# Patient Record
Sex: MALE | Race: WHITE | NOT HISPANIC OR LATINO | Employment: OTHER | URBAN - METROPOLITAN AREA
[De-identification: names, ages, dates, MRNs, and addresses within clinical notes are randomized per-mention and may not be internally consistent; named-entity substitution may affect disease eponyms.]

---

## 2020-02-23 ENCOUNTER — APPOINTMENT (OUTPATIENT)
Dept: GENERAL RADIOLOGY | Facility: HOSPITAL | Age: 55
End: 2020-02-23

## 2020-02-23 ENCOUNTER — HOSPITAL ENCOUNTER (INPATIENT)
Facility: HOSPITAL | Age: 55
LOS: 7 days | Discharge: HOME | End: 2020-03-01
Attending: EMERGENCY MEDICINE | Admitting: FAMILY MEDICINE

## 2020-02-23 ENCOUNTER — APPOINTMENT (OUTPATIENT)
Dept: CT IMAGING | Facility: HOSPITAL | Age: 55
End: 2020-02-23

## 2020-02-23 DIAGNOSIS — Z74.09 IMPAIRED MOBILITY: ICD-10-CM

## 2020-02-23 DIAGNOSIS — K72.00 ACUTE LIVER FAILURE WITHOUT HEPATIC COMA: ICD-10-CM

## 2020-02-23 DIAGNOSIS — F10.11 HISTORY OF ALCOHOL ABUSE: ICD-10-CM

## 2020-02-23 DIAGNOSIS — K74.60 CIRRHOSIS OF LIVER WITH ASCITES, UNSPECIFIED HEPATIC CIRRHOSIS TYPE (HCC): Primary | ICD-10-CM

## 2020-02-23 DIAGNOSIS — K70.11 ALCOHOLIC HEPATITIS WITH ASCITES: ICD-10-CM

## 2020-02-23 DIAGNOSIS — N17.9 ACUTE KIDNEY INJURY (NONTRAUMATIC) (HCC): ICD-10-CM

## 2020-02-23 DIAGNOSIS — R18.8 CIRRHOSIS OF LIVER WITH ASCITES, UNSPECIFIED HEPATIC CIRRHOSIS TYPE (HCC): Primary | ICD-10-CM

## 2020-02-23 DIAGNOSIS — R17 JAUNDICE: ICD-10-CM

## 2020-02-23 PROBLEM — K70.31 ALCOHOLIC CIRRHOSIS OF LIVER WITH ASCITES (HCC): Status: ACTIVE | Noted: 2020-02-23

## 2020-02-23 PROBLEM — N28.9 RENAL LESION: Status: ACTIVE | Noted: 2020-02-23

## 2020-02-23 PROBLEM — R65.10 SIRS (SYSTEMIC INFLAMMATORY RESPONSE SYNDROME) (HCC): Status: ACTIVE | Noted: 2020-02-23

## 2020-02-23 PROBLEM — D72.829 LEUKOCYTOSIS: Status: ACTIVE | Noted: 2020-02-23

## 2020-02-23 PROBLEM — K75.9 JAUNDICE DUE TO HEPATITIS: Status: ACTIVE | Noted: 2020-02-23

## 2020-02-23 PROBLEM — D53.9 MACROCYTIC ANEMIA: Status: ACTIVE | Noted: 2020-02-23

## 2020-02-23 PROBLEM — D68.9 COAGULOPATHY (HCC): Status: ACTIVE | Noted: 2020-02-23

## 2020-02-23 PROBLEM — IMO0001 ALCOHOLISM /ALCOHOL ABUSE: Status: ACTIVE | Noted: 2020-02-23

## 2020-02-23 LAB
25(OH)D3 SERPL-MCNC: 40.6 NG/ML (ref 30–100)
ALBUMIN FLD-MCNC: <0.4 G/DL
ALBUMIN SERPL-MCNC: 2.7 G/DL (ref 3.5–5.2)
ALBUMIN/GLOB SERPL: 0.5 G/DL
ALP SERPL-CCNC: 217 U/L (ref 39–117)
ALPHA-FETOPROTEIN: 5.94 NG/ML (ref 0–8.3)
ALT SERPL W P-5'-P-CCNC: 70 U/L (ref 1–41)
AMMONIA BLD-SCNC: 60 UMOL/L (ref 16–60)
AMYLASE FLD-CCNC: 18 U/L
ANION GAP SERPL CALCULATED.3IONS-SCNC: 15 MMOL/L (ref 5–15)
APPEARANCE FLD: CLEAR
AST SERPL-CCNC: 124 U/L (ref 1–40)
BACTERIA UR QL AUTO: ABNORMAL /HPF
BASOPHILS # BLD AUTO: 0.07 10*3/MM3 (ref 0–0.2)
BASOPHILS NFR BLD AUTO: 0.3 % (ref 0–1.5)
BILIRUB SERPL-MCNC: 12.8 MG/DL (ref 0.2–1.2)
BILIRUB UR QL STRIP: ABNORMAL
BUN BLD-MCNC: 82 MG/DL (ref 6–20)
BUN/CREAT SERPL: 30.8 (ref 7–25)
CALCIUM SPEC-SCNC: 8.6 MG/DL (ref 8.6–10.5)
CHLORIDE SERPL-SCNC: 84 MMOL/L (ref 98–107)
CLARITY UR: CLEAR
CO2 SERPL-SCNC: 28 MMOL/L (ref 22–29)
COLOR FLD: YELLOW
COLOR UR: ABNORMAL
CREAT BLD-MCNC: 2.66 MG/DL (ref 0.76–1.27)
D-LACTATE SERPL-SCNC: 2.6 MMOL/L (ref 0.5–2)
D-LACTATE SERPL-SCNC: 3.9 MMOL/L (ref 0.5–2)
DEPRECATED RDW RBC AUTO: 73.6 FL (ref 37–54)
EOSINOPHIL # BLD AUTO: 0.2 10*3/MM3 (ref 0–0.4)
EOSINOPHIL NFR BLD AUTO: 0.9 % (ref 0.3–6.2)
ERYTHROCYTE [DISTWIDTH] IN BLOOD BY AUTOMATED COUNT: 18 % (ref 12.3–15.4)
ETHANOL BLD-MCNC: <10 MG/DL (ref 0–10)
FERRITIN SERPL-MCNC: 1227 NG/ML (ref 30–400)
FOLATE SERPL-MCNC: 8.68 NG/ML (ref 4.78–24.2)
GFR SERPL CREATININE-BSD FRML MDRD: 25 ML/MIN/1.73
GLOBULIN UR ELPH-MCNC: 5.3 GM/DL
GLUCOSE BLD-MCNC: 143 MG/DL (ref 65–99)
GLUCOSE FLD-MCNC: 143 MG/DL
GLUCOSE UR STRIP-MCNC: NEGATIVE MG/DL
HAV IGM SERPL QL IA: NORMAL
HBA1C MFR BLD: 4.3 % (ref 4.8–5.6)
HBV CORE IGM SERPL QL IA: NORMAL
HBV SURFACE AG SERPL QL IA: NORMAL
HCT VFR BLD AUTO: 28.9 % (ref 37.5–51)
HCV AB SER DONR QL: NORMAL
HGB BLD-MCNC: 10.2 G/DL (ref 13–17.7)
HGB UR QL STRIP.AUTO: NEGATIVE
HOLD SPECIMEN: NORMAL
HOLD SPECIMEN: NORMAL
HYALINE CASTS UR QL AUTO: ABNORMAL /LPF
IMM GRANULOCYTES # BLD AUTO: 0.27 10*3/MM3 (ref 0–0.05)
IMM GRANULOCYTES NFR BLD AUTO: 1.2 % (ref 0–0.5)
INR PPP: 2 (ref 0.85–1.16)
IRON 24H UR-MRATE: 107 MCG/DL (ref 59–158)
IRON SATN MFR SERPL: 64 % (ref 20–50)
KETONES UR QL STRIP: NEGATIVE
LACTATE HOLD SPECIMEN: NORMAL
LDH FLD-CCNC: 42 U/L
LEUKOCYTE ESTERASE UR QL STRIP.AUTO: ABNORMAL
LIPASE SERPL-CCNC: 116 U/L (ref 13–60)
LYMPHOCYTES # BLD AUTO: 1.82 10*3/MM3 (ref 0.7–3.1)
LYMPHOCYTES NFR BLD AUTO: 8.1 % (ref 19.6–45.3)
LYMPHOCYTES NFR FLD MANUAL: 50 %
MACROPHAGE FLUID: 8 %
MCH RBC QN AUTO: 39.7 PG (ref 26.6–33)
MCHC RBC AUTO-ENTMCNC: 35.3 G/DL (ref 31.5–35.7)
MCV RBC AUTO: 112.5 FL (ref 79–97)
MESOTHL CELL NFR FLD MANUAL: 22 %
MONOCYTES # BLD AUTO: 1.84 10*3/MM3 (ref 0.1–0.9)
MONOCYTES NFR BLD AUTO: 8.2 % (ref 5–12)
MONOCYTES NFR FLD: 16 %
NEUTROPHILS # BLD AUTO: 18.19 10*3/MM3 (ref 1.7–7)
NEUTROPHILS NFR BLD AUTO: 81.3 % (ref 42.7–76)
NEUTROPHILS NFR FLD MANUAL: 4 %
NITRITE UR QL STRIP: POSITIVE
NRBC BLD AUTO-RTO: 0.2 /100 WBC (ref 0–0.2)
PH UR STRIP.AUTO: <=5 [PH] (ref 5–8)
PLATELET # BLD AUTO: 285 10*3/MM3 (ref 140–450)
PMV BLD AUTO: 10.3 FL (ref 6–12)
POTASSIUM BLD-SCNC: 3.9 MMOL/L (ref 3.5–5.2)
POTASSIUM UR-SCNC: 46.9 MMOL/L
PROT FLD-MCNC: <1 G/DL
PROT SERPL-MCNC: 8 G/DL (ref 6–8.5)
PROT UR QL STRIP: NEGATIVE
PROTHROMBIN TIME: 21.8 SECONDS (ref 11.2–14.3)
RBC # BLD AUTO: 2.57 10*6/MM3 (ref 4.14–5.8)
RBC # FLD AUTO: <2000 /MM3
RBC # UR: ABNORMAL /HPF
REF LAB TEST METHOD: ABNORMAL
RETICS # AUTO: 0.22 10*6/MM3 (ref 0.02–0.13)
RETICS/RBC NFR AUTO: 8.62 % (ref 0.7–1.9)
SODIUM BLD-SCNC: 127 MMOL/L (ref 136–145)
SODIUM UR-SCNC: <20 MMOL/L
SP GR UR STRIP: 1.02 (ref 1–1.03)
SQUAMOUS #/AREA URNS HPF: ABNORMAL /HPF
TIBC SERPL-MCNC: 167 MCG/DL (ref 298–536)
TRANSFERRIN SERPL-MCNC: 112 MG/DL (ref 200–360)
UROBILINOGEN UR QL STRIP: ABNORMAL
VIT B12 BLD-MCNC: >2000 PG/ML (ref 211–946)
WBC # FLD AUTO: 87 /MM3
WBC NRBC COR # BLD: 22.39 10*3/MM3 (ref 3.4–10.8)
WBC UR QL AUTO: ABNORMAL /HPF
WHOLE BLOOD HOLD SPECIMEN: NORMAL
WHOLE BLOOD HOLD SPECIMEN: NORMAL

## 2020-02-23 PROCEDURE — 82247 BILIRUBIN TOTAL: CPT | Performed by: INTERNAL MEDICINE

## 2020-02-23 PROCEDURE — 83605 ASSAY OF LACTIC ACID: CPT | Performed by: EMERGENCY MEDICINE

## 2020-02-23 PROCEDURE — 80074 ACUTE HEPATITIS PANEL: CPT | Performed by: INTERNAL MEDICINE

## 2020-02-23 PROCEDURE — 87075 CULTR BACTERIA EXCEPT BLOOD: CPT | Performed by: INTERNAL MEDICINE

## 2020-02-23 PROCEDURE — 87086 URINE CULTURE/COLONY COUNT: CPT | Performed by: INTERNAL MEDICINE

## 2020-02-23 PROCEDURE — 82150 ASSAY OF AMYLASE: CPT | Performed by: INTERNAL MEDICINE

## 2020-02-23 PROCEDURE — 89051 BODY FLUID CELL COUNT: CPT | Performed by: INTERNAL MEDICINE

## 2020-02-23 PROCEDURE — 99223 1ST HOSP IP/OBS HIGH 75: CPT | Performed by: INTERNAL MEDICINE

## 2020-02-23 PROCEDURE — 84466 ASSAY OF TRANSFERRIN: CPT | Performed by: INTERNAL MEDICINE

## 2020-02-23 PROCEDURE — 83036 HEMOGLOBIN GLYCOSYLATED A1C: CPT | Performed by: INTERNAL MEDICINE

## 2020-02-23 PROCEDURE — 82746 ASSAY OF FOLIC ACID SERUM: CPT | Performed by: INTERNAL MEDICINE

## 2020-02-23 PROCEDURE — 85025 COMPLETE CBC W/AUTO DIFF WBC: CPT | Performed by: EMERGENCY MEDICINE

## 2020-02-23 PROCEDURE — 82607 VITAMIN B-12: CPT | Performed by: INTERNAL MEDICINE

## 2020-02-23 PROCEDURE — 82945 GLUCOSE OTHER FLUID: CPT | Performed by: INTERNAL MEDICINE

## 2020-02-23 PROCEDURE — 82306 VITAMIN D 25 HYDROXY: CPT | Performed by: INTERNAL MEDICINE

## 2020-02-23 PROCEDURE — 85610 PROTHROMBIN TIME: CPT | Performed by: EMERGENCY MEDICINE

## 2020-02-23 PROCEDURE — 81001 URINALYSIS AUTO W/SCOPE: CPT | Performed by: EMERGENCY MEDICINE

## 2020-02-23 PROCEDURE — G0378 HOSPITAL OBSERVATION PER HR: HCPCS

## 2020-02-23 PROCEDURE — 0W9G3ZZ DRAINAGE OF PERITONEAL CAVITY, PERCUTANEOUS APPROACH: ICD-10-PCS | Performed by: INTERNAL MEDICINE

## 2020-02-23 PROCEDURE — 82105 ALPHA-FETOPROTEIN SERUM: CPT | Performed by: INTERNAL MEDICINE

## 2020-02-23 PROCEDURE — 25010000002 ALBUMIN HUMAN 25% PER 50 ML: Performed by: INTERNAL MEDICINE

## 2020-02-23 PROCEDURE — 25010000002 CEFTRIAXONE PER 250 MG: Performed by: EMERGENCY MEDICINE

## 2020-02-23 PROCEDURE — 83615 LACTATE (LD) (LDH) ENZYME: CPT | Performed by: INTERNAL MEDICINE

## 2020-02-23 PROCEDURE — P9047 ALBUMIN (HUMAN), 25%, 50ML: HCPCS | Performed by: INTERNAL MEDICINE

## 2020-02-23 PROCEDURE — 87040 BLOOD CULTURE FOR BACTERIA: CPT | Performed by: EMERGENCY MEDICINE

## 2020-02-23 PROCEDURE — 83690 ASSAY OF LIPASE: CPT | Performed by: EMERGENCY MEDICINE

## 2020-02-23 PROCEDURE — 84300 ASSAY OF URINE SODIUM: CPT | Performed by: INTERNAL MEDICINE

## 2020-02-23 PROCEDURE — 84157 ASSAY OF PROTEIN OTHER: CPT | Performed by: INTERNAL MEDICINE

## 2020-02-23 PROCEDURE — 49082 ABD PARACENTESIS: CPT | Performed by: INTERNAL MEDICINE

## 2020-02-23 PROCEDURE — 82728 ASSAY OF FERRITIN: CPT | Performed by: INTERNAL MEDICINE

## 2020-02-23 PROCEDURE — 82042 OTHER SOURCE ALBUMIN QUAN EA: CPT | Performed by: INTERNAL MEDICINE

## 2020-02-23 PROCEDURE — 25010000003 PHYTONADIONE 10 MG/ML SOLUTION 1 ML AMPULE: Performed by: INTERNAL MEDICINE

## 2020-02-23 PROCEDURE — 80053 COMPREHEN METABOLIC PANEL: CPT | Performed by: EMERGENCY MEDICINE

## 2020-02-23 PROCEDURE — 80307 DRUG TEST PRSMV CHEM ANLYZR: CPT | Performed by: EMERGENCY MEDICINE

## 2020-02-23 PROCEDURE — 84133 ASSAY OF URINE POTASSIUM: CPT | Performed by: INTERNAL MEDICINE

## 2020-02-23 PROCEDURE — 71045 X-RAY EXAM CHEST 1 VIEW: CPT

## 2020-02-23 PROCEDURE — 99253 IP/OBS CNSLTJ NEW/EST LOW 45: CPT | Performed by: INTERNAL MEDICINE

## 2020-02-23 PROCEDURE — 82140 ASSAY OF AMMONIA: CPT | Performed by: EMERGENCY MEDICINE

## 2020-02-23 PROCEDURE — 99284 EMERGENCY DEPT VISIT MOD MDM: CPT

## 2020-02-23 PROCEDURE — 87205 SMEAR GRAM STAIN: CPT | Performed by: INTERNAL MEDICINE

## 2020-02-23 PROCEDURE — 25010000002 OCTREOTIDE PER 25 MCG: Performed by: INTERNAL MEDICINE

## 2020-02-23 PROCEDURE — 74176 CT ABD & PELVIS W/O CONTRAST: CPT

## 2020-02-23 PROCEDURE — 83540 ASSAY OF IRON: CPT | Performed by: INTERNAL MEDICINE

## 2020-02-23 PROCEDURE — 85045 AUTOMATED RETICULOCYTE COUNT: CPT | Performed by: INTERNAL MEDICINE

## 2020-02-23 PROCEDURE — 87070 CULTURE OTHR SPECIMN AEROBIC: CPT | Performed by: INTERNAL MEDICINE

## 2020-02-23 RX ORDER — SODIUM CHLORIDE 0.9 % (FLUSH) 0.9 %
10 SYRINGE (ML) INJECTION AS NEEDED
Status: DISCONTINUED | OUTPATIENT
Start: 2020-02-23 | End: 2020-02-23

## 2020-02-23 RX ORDER — TRAMADOL HYDROCHLORIDE 50 MG/1
50 TABLET ORAL EVERY 6 HOURS PRN
Status: DISCONTINUED | OUTPATIENT
Start: 2020-02-23 | End: 2020-03-01 | Stop reason: HOSPADM

## 2020-02-23 RX ORDER — SODIUM CHLORIDE 0.9 % (FLUSH) 0.9 %
10 SYRINGE (ML) INJECTION EVERY 12 HOURS SCHEDULED
Status: DISCONTINUED | OUTPATIENT
Start: 2020-02-23 | End: 2020-03-01 | Stop reason: HOSPADM

## 2020-02-23 RX ORDER — ALBUMIN (HUMAN) 12.5 G/50ML
50 SOLUTION INTRAVENOUS ONCE
Status: COMPLETED | OUTPATIENT
Start: 2020-02-23 | End: 2020-02-23

## 2020-02-23 RX ORDER — ZINC SULFATE 50(220)MG
220 CAPSULE ORAL DAILY
Status: DISCONTINUED | OUTPATIENT
Start: 2020-02-24 | End: 2020-03-01 | Stop reason: HOSPADM

## 2020-02-23 RX ORDER — PENTOXIFYLLINE 400 MG/1
400 TABLET, EXTENDED RELEASE ORAL
Status: DISCONTINUED | OUTPATIENT
Start: 2020-02-23 | End: 2020-02-25

## 2020-02-23 RX ORDER — MORPHINE SULFATE 2 MG/ML
1 INJECTION, SOLUTION INTRAMUSCULAR; INTRAVENOUS EVERY 4 HOURS PRN
Status: DISCONTINUED | OUTPATIENT
Start: 2020-02-23 | End: 2020-03-01 | Stop reason: HOSPADM

## 2020-02-23 RX ORDER — DOCUSATE SODIUM 100 MG/1
100 CAPSULE, LIQUID FILLED ORAL 2 TIMES DAILY PRN
Status: DISCONTINUED | OUTPATIENT
Start: 2020-02-23 | End: 2020-03-01 | Stop reason: HOSPADM

## 2020-02-23 RX ORDER — THIAMINE MONONITRATE (VIT B1) 100 MG
200 TABLET ORAL DAILY
Status: DISCONTINUED | OUTPATIENT
Start: 2020-02-24 | End: 2020-03-01 | Stop reason: HOSPADM

## 2020-02-23 RX ORDER — OCTREOTIDE ACETATE 500 UG/ML
100 INJECTION, SOLUTION INTRAVENOUS; SUBCUTANEOUS 3 TIMES DAILY
Status: DISCONTINUED | OUTPATIENT
Start: 2020-02-23 | End: 2020-02-26

## 2020-02-23 RX ORDER — ONDANSETRON 4 MG/1
4 TABLET, FILM COATED ORAL EVERY 6 HOURS PRN
Status: DISCONTINUED | OUTPATIENT
Start: 2020-02-23 | End: 2020-03-01 | Stop reason: HOSPADM

## 2020-02-23 RX ORDER — ONDANSETRON 2 MG/ML
4 INJECTION INTRAMUSCULAR; INTRAVENOUS EVERY 6 HOURS PRN
Status: DISCONTINUED | OUTPATIENT
Start: 2020-02-23 | End: 2020-03-01 | Stop reason: HOSPADM

## 2020-02-23 RX ORDER — FAMOTIDINE 20 MG/1
20 TABLET, FILM COATED ORAL 2 TIMES DAILY PRN
Status: DISCONTINUED | OUTPATIENT
Start: 2020-02-23 | End: 2020-03-01 | Stop reason: HOSPADM

## 2020-02-23 RX ORDER — CHOLECALCIFEROL (VITAMIN D3) 125 MCG
5 CAPSULE ORAL NIGHTLY PRN
Status: DISCONTINUED | OUTPATIENT
Start: 2020-02-23 | End: 2020-02-25

## 2020-02-23 RX ORDER — NALOXONE HCL 0.4 MG/ML
0.4 VIAL (ML) INJECTION
Status: DISCONTINUED | OUTPATIENT
Start: 2020-02-23 | End: 2020-03-01 | Stop reason: HOSPADM

## 2020-02-23 RX ORDER — SODIUM CHLORIDE 9 MG/ML
75 INJECTION, SOLUTION INTRAVENOUS CONTINUOUS
Status: ACTIVE | OUTPATIENT
Start: 2020-02-23 | End: 2020-02-24

## 2020-02-23 RX ORDER — SODIUM CHLORIDE 0.9 % (FLUSH) 0.9 %
10 SYRINGE (ML) INJECTION AS NEEDED
Status: DISCONTINUED | OUTPATIENT
Start: 2020-02-23 | End: 2020-03-01 | Stop reason: HOSPADM

## 2020-02-23 RX ORDER — MIDODRINE HYDROCHLORIDE 5 MG/1
2.5 TABLET ORAL
Status: DISCONTINUED | OUTPATIENT
Start: 2020-02-23 | End: 2020-02-25

## 2020-02-23 RX ORDER — MULTIPLE VITAMINS W/ MINERALS TAB 9MG-400MCG
1 TAB ORAL DAILY
Status: DISCONTINUED | OUTPATIENT
Start: 2020-02-24 | End: 2020-03-01 | Stop reason: HOSPADM

## 2020-02-23 RX ADMIN — CEFTRIAXONE 2 G: 2 INJECTION, POWDER, FOR SOLUTION INTRAMUSCULAR; INTRAVENOUS at 15:37

## 2020-02-23 RX ADMIN — OCTREOTIDE ACETATE 100 MCG: 500 INJECTION, SOLUTION INTRAVENOUS; SUBCUTANEOUS at 20:27

## 2020-02-23 RX ADMIN — SODIUM CHLORIDE 75 ML/HR: 9 INJECTION, SOLUTION INTRAVENOUS at 18:58

## 2020-02-23 RX ADMIN — SODIUM CHLORIDE 500 ML: 9 INJECTION, SOLUTION INTRAVENOUS at 15:43

## 2020-02-23 RX ADMIN — MIDODRINE HYDROCHLORIDE 2.5 MG: 5 TABLET ORAL at 20:27

## 2020-02-23 RX ADMIN — ALBUMIN HUMAN 50 G: 0.25 SOLUTION INTRAVENOUS at 18:49

## 2020-02-23 RX ADMIN — PHYTONADIONE 10 MG: 10 INJECTION, EMULSION INTRAMUSCULAR; INTRAVENOUS; SUBCUTANEOUS at 20:28

## 2020-02-23 RX ADMIN — PENTOXIFYLLINE 400 MG: 400 TABLET, EXTENDED RELEASE ORAL at 20:26

## 2020-02-24 ENCOUNTER — APPOINTMENT (OUTPATIENT)
Dept: ULTRASOUND IMAGING | Facility: HOSPITAL | Age: 55
End: 2020-02-24

## 2020-02-24 PROBLEM — E87.1 HYPONATREMIA: Status: ACTIVE | Noted: 2020-02-24

## 2020-02-24 PROBLEM — E43 SEVERE MALNUTRITION (HCC): Status: ACTIVE | Noted: 2020-02-24

## 2020-02-24 PROBLEM — R18.8 ASCITES: Status: ACTIVE | Noted: 2020-02-24

## 2020-02-24 LAB
ALBUMIN SERPL-MCNC: 2.6 G/DL (ref 3.5–5.2)
ALBUMIN/GLOB SERPL: 0.7 G/DL
ALP SERPL-CCNC: 153 U/L (ref 39–117)
ALT SERPL W P-5'-P-CCNC: 52 U/L (ref 1–41)
ANION GAP SERPL CALCULATED.3IONS-SCNC: 13 MMOL/L (ref 5–15)
AST SERPL-CCNC: 91 U/L (ref 1–40)
BACTERIA SPEC AEROBE CULT: NO GROWTH
BILIRUB SERPL-MCNC: 10.3 MG/DL (ref 0.2–1.2)
BUN BLD-MCNC: 74 MG/DL (ref 6–20)
BUN/CREAT SERPL: 32.7 (ref 7–25)
CALCIUM SPEC-SCNC: 8.1 MG/DL (ref 8.6–10.5)
CHLORIDE SERPL-SCNC: 90 MMOL/L (ref 98–107)
CO2 SERPL-SCNC: 27 MMOL/L (ref 22–29)
CREAT BLD-MCNC: 2.26 MG/DL (ref 0.76–1.27)
DEPRECATED RDW RBC AUTO: 73.6 FL (ref 37–54)
ERYTHROCYTE [DISTWIDTH] IN BLOOD BY AUTOMATED COUNT: 17.3 % (ref 12.3–15.4)
GFR SERPL CREATININE-BSD FRML MDRD: 30 ML/MIN/1.73
GLOBULIN UR ELPH-MCNC: 4 GM/DL
GLUCOSE BLD-MCNC: 147 MG/DL (ref 65–99)
HCT VFR BLD AUTO: 23.5 % (ref 37.5–51)
HGB BLD-MCNC: 8.3 G/DL (ref 13–17.7)
HOLD SPECIMEN: NORMAL
INR PPP: 2.17 (ref 0.85–1.16)
MAGNESIUM SERPL-MCNC: 2.1 MG/DL (ref 1.6–2.6)
MCH RBC QN AUTO: 41.1 PG (ref 26.6–33)
MCHC RBC AUTO-ENTMCNC: 35.3 G/DL (ref 31.5–35.7)
MCV RBC AUTO: 116.3 FL (ref 79–97)
PLATELET # BLD AUTO: 198 10*3/MM3 (ref 140–450)
PMV BLD AUTO: 10.3 FL (ref 6–12)
POTASSIUM BLD-SCNC: 3.7 MMOL/L (ref 3.5–5.2)
PROT SERPL-MCNC: 6.6 G/DL (ref 6–8.5)
PROTHROMBIN TIME: 23.2 SECONDS (ref 11.2–14.3)
RBC # BLD AUTO: 2.02 10*6/MM3 (ref 4.14–5.8)
SODIUM BLD-SCNC: 130 MMOL/L (ref 136–145)
SODIUM UR-SCNC: <20 MMOL/L
WBC NRBC COR # BLD: 20.08 10*3/MM3 (ref 3.4–10.8)

## 2020-02-24 PROCEDURE — 83735 ASSAY OF MAGNESIUM: CPT | Performed by: INTERNAL MEDICINE

## 2020-02-24 PROCEDURE — 84300 ASSAY OF URINE SODIUM: CPT | Performed by: INTERNAL MEDICINE

## 2020-02-24 PROCEDURE — 76775 US EXAM ABDO BACK WALL LIM: CPT

## 2020-02-24 PROCEDURE — 86708 HEPATITIS A ANTIBODY: CPT | Performed by: INTERNAL MEDICINE

## 2020-02-24 PROCEDURE — P9047 ALBUMIN (HUMAN), 25%, 50ML: HCPCS | Performed by: PHYSICIAN ASSISTANT

## 2020-02-24 PROCEDURE — 25010000002 OCTREOTIDE PER 25 MCG: Performed by: INTERNAL MEDICINE

## 2020-02-24 PROCEDURE — 63710000001 ONDANSETRON PER 8 MG: Performed by: INTERNAL MEDICINE

## 2020-02-24 PROCEDURE — 85027 COMPLETE CBC AUTOMATED: CPT | Performed by: INTERNAL MEDICINE

## 2020-02-24 PROCEDURE — 99233 SBSQ HOSP IP/OBS HIGH 50: CPT | Performed by: INTERNAL MEDICINE

## 2020-02-24 PROCEDURE — 99232 SBSQ HOSP IP/OBS MODERATE 35: CPT | Performed by: PHYSICIAN ASSISTANT

## 2020-02-24 PROCEDURE — 25010000002 CEFTRIAXONE PER 250 MG: Performed by: INTERNAL MEDICINE

## 2020-02-24 PROCEDURE — 86038 ANTINUCLEAR ANTIBODIES: CPT | Performed by: INTERNAL MEDICINE

## 2020-02-24 PROCEDURE — 85610 PROTHROMBIN TIME: CPT | Performed by: INTERNAL MEDICINE

## 2020-02-24 PROCEDURE — 86317 IMMUNOASSAY INFECTIOUS AGENT: CPT | Performed by: INTERNAL MEDICINE

## 2020-02-24 PROCEDURE — 80053 COMPREHEN METABOLIC PANEL: CPT | Performed by: INTERNAL MEDICINE

## 2020-02-24 PROCEDURE — 25010000002 ALBUMIN HUMAN 25% PER 50 ML: Performed by: PHYSICIAN ASSISTANT

## 2020-02-24 RX ORDER — ALBUMIN (HUMAN) 12.5 G/50ML
50 SOLUTION INTRAVENOUS ONCE
Status: COMPLETED | OUTPATIENT
Start: 2020-02-24 | End: 2020-02-24

## 2020-02-24 RX ADMIN — MIDODRINE HYDROCHLORIDE 2.5 MG: 5 TABLET ORAL at 08:31

## 2020-02-24 RX ADMIN — MULTIPLE VITAMINS W/ MINERALS TAB 1 TABLET: TAB at 08:31

## 2020-02-24 RX ADMIN — PENTOXIFYLLINE 400 MG: 400 TABLET, EXTENDED RELEASE ORAL at 08:31

## 2020-02-24 RX ADMIN — CEFTRIAXONE 2 G: 2 INJECTION, POWDER, FOR SOLUTION INTRAMUSCULAR; INTRAVENOUS at 08:31

## 2020-02-24 RX ADMIN — PENTOXIFYLLINE 400 MG: 400 TABLET, EXTENDED RELEASE ORAL at 11:55

## 2020-02-24 RX ADMIN — THIAMINE HCL TAB 100 MG 200 MG: 100 TAB at 08:31

## 2020-02-24 RX ADMIN — MIDODRINE HYDROCHLORIDE 2.5 MG: 5 TABLET ORAL at 17:11

## 2020-02-24 RX ADMIN — SODIUM CHLORIDE, PRESERVATIVE FREE 10 ML: 5 INJECTION INTRAVENOUS at 22:13

## 2020-02-24 RX ADMIN — SODIUM CHLORIDE, PRESERVATIVE FREE 10 ML: 5 INJECTION INTRAVENOUS at 08:37

## 2020-02-24 RX ADMIN — SODIUM CHLORIDE 75 ML/HR: 9 INJECTION, SOLUTION INTRAVENOUS at 10:56

## 2020-02-24 RX ADMIN — PENTOXIFYLLINE 400 MG: 400 TABLET, EXTENDED RELEASE ORAL at 17:11

## 2020-02-24 RX ADMIN — OCTREOTIDE ACETATE 100 MCG: 500 INJECTION, SOLUTION INTRAVENOUS; SUBCUTANEOUS at 17:11

## 2020-02-24 RX ADMIN — ONDANSETRON HYDROCHLORIDE 4 MG: 4 TABLET, FILM COATED ORAL at 22:22

## 2020-02-24 RX ADMIN — OCTREOTIDE ACETATE 100 MCG: 500 INJECTION, SOLUTION INTRAVENOUS; SUBCUTANEOUS at 22:12

## 2020-02-24 RX ADMIN — MIDODRINE HYDROCHLORIDE 2.5 MG: 5 TABLET ORAL at 10:56

## 2020-02-24 RX ADMIN — MELATONIN TAB 5 MG 5 MG: 5 TAB at 22:13

## 2020-02-24 RX ADMIN — OCTREOTIDE ACETATE 100 MCG: 500 INJECTION, SOLUTION INTRAVENOUS; SUBCUTANEOUS at 08:31

## 2020-02-24 RX ADMIN — Medication 220 MG: at 08:31

## 2020-02-24 RX ADMIN — ALBUMIN HUMAN 50 G: 0.25 SOLUTION INTRAVENOUS at 13:29

## 2020-02-25 LAB
ALBUMIN SERPL-MCNC: 3.1 G/DL (ref 3.5–5.2)
ALBUMIN/GLOB SERPL: 0.9 G/DL
ALP SERPL-CCNC: 163 U/L (ref 39–117)
ALT SERPL W P-5'-P-CCNC: 50 U/L (ref 1–41)
ANA SER QL: NEGATIVE
ANION GAP SERPL CALCULATED.3IONS-SCNC: 14 MMOL/L (ref 5–15)
AST SERPL-CCNC: 102 U/L (ref 1–40)
BASOPHILS # BLD AUTO: 0.08 10*3/MM3 (ref 0–0.2)
BASOPHILS NFR BLD AUTO: 0.4 % (ref 0–1.5)
BILIRUB SERPL-MCNC: 11.1 MG/DL (ref 0.2–1.2)
BUN BLD-MCNC: 68 MG/DL (ref 6–20)
BUN/CREAT SERPL: 36 (ref 7–25)
CALCIUM SPEC-SCNC: 8.6 MG/DL (ref 8.6–10.5)
CHLORIDE SERPL-SCNC: 92 MMOL/L (ref 98–107)
CO2 SERPL-SCNC: 27 MMOL/L (ref 22–29)
CREAT BLD-MCNC: 1.89 MG/DL (ref 0.76–1.27)
DEPRECATED RDW RBC AUTO: 73.3 FL (ref 37–54)
EOSINOPHIL # BLD AUTO: 0.25 10*3/MM3 (ref 0–0.4)
EOSINOPHIL NFR BLD AUTO: 1.3 % (ref 0.3–6.2)
ERYTHROCYTE [DISTWIDTH] IN BLOOD BY AUTOMATED COUNT: 17.2 % (ref 12.3–15.4)
GFR SERPL CREATININE-BSD FRML MDRD: 37 ML/MIN/1.73
GLOBULIN UR ELPH-MCNC: 3.6 GM/DL
GLUCOSE BLD-MCNC: 123 MG/DL (ref 65–99)
HAV AB SER QL IA: NEGATIVE
HBV SURFACE AB SER-ACNC: 3.5 MIU/ML
HCT VFR BLD AUTO: 25.2 % (ref 37.5–51)
HGB BLD-MCNC: 8.9 G/DL (ref 13–17.7)
IMM GRANULOCYTES # BLD AUTO: 0.13 10*3/MM3 (ref 0–0.05)
IMM GRANULOCYTES NFR BLD AUTO: 0.7 % (ref 0–0.5)
INR PPP: 2.1 (ref 0.85–1.16)
LYMPHOCYTES # BLD AUTO: 1.88 10*3/MM3 (ref 0.7–3.1)
LYMPHOCYTES NFR BLD AUTO: 9.6 % (ref 19.6–45.3)
MCH RBC QN AUTO: 41 PG (ref 26.6–33)
MCHC RBC AUTO-ENTMCNC: 35.3 G/DL (ref 31.5–35.7)
MCV RBC AUTO: 116.1 FL (ref 79–97)
MONOCYTES # BLD AUTO: 1.32 10*3/MM3 (ref 0.1–0.9)
MONOCYTES NFR BLD AUTO: 6.7 % (ref 5–12)
NEUTROPHILS # BLD AUTO: 15.96 10*3/MM3 (ref 1.7–7)
NEUTROPHILS NFR BLD AUTO: 81.3 % (ref 42.7–76)
NRBC BLD AUTO-RTO: 0.2 /100 WBC (ref 0–0.2)
PLATELET # BLD AUTO: 173 10*3/MM3 (ref 140–450)
PMV BLD AUTO: 10.5 FL (ref 6–12)
POTASSIUM BLD-SCNC: 3.7 MMOL/L (ref 3.5–5.2)
PROT SERPL-MCNC: 6.7 G/DL (ref 6–8.5)
PROTHROMBIN TIME: 22.6 SECONDS (ref 11.2–14.3)
RBC # BLD AUTO: 2.17 10*6/MM3 (ref 4.14–5.8)
SODIUM BLD-SCNC: 133 MMOL/L (ref 136–145)
WBC NRBC COR # BLD: 19.62 10*3/MM3 (ref 3.4–10.8)

## 2020-02-25 PROCEDURE — 87070 CULTURE OTHR SPECIMN AEROBIC: CPT | Performed by: PHYSICIAN ASSISTANT

## 2020-02-25 PROCEDURE — 49082 ABD PARACENTESIS: CPT | Performed by: INTERNAL MEDICINE

## 2020-02-25 PROCEDURE — 85025 COMPLETE CBC W/AUTO DIFF WBC: CPT | Performed by: INTERNAL MEDICINE

## 2020-02-25 PROCEDURE — 99232 SBSQ HOSP IP/OBS MODERATE 35: CPT | Performed by: PHYSICIAN ASSISTANT

## 2020-02-25 PROCEDURE — 0W9G3ZZ DRAINAGE OF PERITONEAL CAVITY, PERCUTANEOUS APPROACH: ICD-10-PCS | Performed by: INTERNAL MEDICINE

## 2020-02-25 PROCEDURE — 89051 BODY FLUID CELL COUNT: CPT | Performed by: PHYSICIAN ASSISTANT

## 2020-02-25 PROCEDURE — 25010000002 ALBUMIN HUMAN 25% PER 50 ML: Performed by: PHYSICIAN ASSISTANT

## 2020-02-25 PROCEDURE — 85610 PROTHROMBIN TIME: CPT | Performed by: INTERNAL MEDICINE

## 2020-02-25 PROCEDURE — 25010000002 OCTREOTIDE PER 25 MCG: Performed by: INTERNAL MEDICINE

## 2020-02-25 PROCEDURE — 81256 HFE GENE: CPT | Performed by: PHYSICIAN ASSISTANT

## 2020-02-25 PROCEDURE — 80053 COMPREHEN METABOLIC PANEL: CPT | Performed by: INTERNAL MEDICINE

## 2020-02-25 PROCEDURE — 25010000002 VITAMIN K1 PER 1 MG: Performed by: PHYSICIAN ASSISTANT

## 2020-02-25 PROCEDURE — 99233 SBSQ HOSP IP/OBS HIGH 50: CPT | Performed by: INTERNAL MEDICINE

## 2020-02-25 PROCEDURE — 88112 CYTOPATH CELL ENHANCE TECH: CPT | Performed by: INTERNAL MEDICINE

## 2020-02-25 PROCEDURE — P9047 ALBUMIN (HUMAN), 25%, 50ML: HCPCS | Performed by: PHYSICIAN ASSISTANT

## 2020-02-25 RX ORDER — ALBUMIN (HUMAN) 12.5 G/50ML
37.5 SOLUTION INTRAVENOUS ONCE
Status: COMPLETED | OUTPATIENT
Start: 2020-02-25 | End: 2020-02-25

## 2020-02-25 RX ORDER — ALBUMIN (HUMAN) 12.5 G/50ML
112.5 SOLUTION INTRAVENOUS ONCE
Status: COMPLETED | OUTPATIENT
Start: 2020-02-25 | End: 2020-02-25

## 2020-02-25 RX ORDER — ALBUMIN (HUMAN) 12.5 G/50ML
62.5 SOLUTION INTRAVENOUS ONCE
Status: COMPLETED | OUTPATIENT
Start: 2020-02-25 | End: 2020-02-25

## 2020-02-25 RX ORDER — ALBUMIN (HUMAN) 12.5 G/50ML
50 SOLUTION INTRAVENOUS ONCE
Status: COMPLETED | OUTPATIENT
Start: 2020-02-25 | End: 2020-02-25

## 2020-02-25 RX ORDER — ALBUMIN (HUMAN) 12.5 G/50ML
100 SOLUTION INTRAVENOUS ONCE
Status: COMPLETED | OUTPATIENT
Start: 2020-02-25 | End: 2020-02-25

## 2020-02-25 RX ORDER — ALBUMIN (HUMAN) 12.5 G/50ML
75 SOLUTION INTRAVENOUS ONCE
Status: COMPLETED | OUTPATIENT
Start: 2020-02-25 | End: 2020-02-25

## 2020-02-25 RX ORDER — CHOLECALCIFEROL (VITAMIN D3) 125 MCG
10 CAPSULE ORAL NIGHTLY
Status: DISCONTINUED | OUTPATIENT
Start: 2020-02-25 | End: 2020-03-01 | Stop reason: HOSPADM

## 2020-02-25 RX ORDER — MIDODRINE HYDROCHLORIDE 5 MG/1
7.5 TABLET ORAL
Status: DISCONTINUED | OUTPATIENT
Start: 2020-02-25 | End: 2020-03-01

## 2020-02-25 RX ORDER — ALBUMIN (HUMAN) 12.5 G/50ML
87.5 SOLUTION INTRAVENOUS ONCE
Status: COMPLETED | OUTPATIENT
Start: 2020-02-25 | End: 2020-02-25

## 2020-02-25 RX ADMIN — MIDODRINE HYDROCHLORIDE 7.5 MG: 5 TABLET ORAL at 16:57

## 2020-02-25 RX ADMIN — MULTIPLE VITAMINS W/ MINERALS TAB 1 TABLET: TAB at 08:06

## 2020-02-25 RX ADMIN — OCTREOTIDE ACETATE 100 MCG: 500 INJECTION, SOLUTION INTRAVENOUS; SUBCUTANEOUS at 20:58

## 2020-02-25 RX ADMIN — ALBUMIN HUMAN 62.5 G: 0.25 SOLUTION INTRAVENOUS at 16:49

## 2020-02-25 RX ADMIN — THIAMINE HCL TAB 100 MG 200 MG: 100 TAB at 08:06

## 2020-02-25 RX ADMIN — MIDODRINE HYDROCHLORIDE 2.5 MG: 5 TABLET ORAL at 06:35

## 2020-02-25 RX ADMIN — SODIUM CHLORIDE, PRESERVATIVE FREE 10 ML: 5 INJECTION INTRAVENOUS at 08:07

## 2020-02-25 RX ADMIN — PENTOXIFYLLINE 400 MG: 400 TABLET, EXTENDED RELEASE ORAL at 08:06

## 2020-02-25 RX ADMIN — PHYTONADIONE 10 MG: 10 INJECTION, EMULSION INTRAMUSCULAR; INTRAVENOUS; SUBCUTANEOUS at 13:06

## 2020-02-25 RX ADMIN — MELATONIN TAB 5 MG 10 MG: 5 TAB at 20:58

## 2020-02-25 RX ADMIN — MIDODRINE HYDROCHLORIDE 7.5 MG: 5 TABLET ORAL at 11:30

## 2020-02-25 RX ADMIN — OCTREOTIDE ACETATE 100 MCG: 500 INJECTION, SOLUTION INTRAVENOUS; SUBCUTANEOUS at 16:51

## 2020-02-25 RX ADMIN — SODIUM CHLORIDE, PRESERVATIVE FREE 10 ML: 5 INJECTION INTRAVENOUS at 21:02

## 2020-02-25 RX ADMIN — Medication 220 MG: at 08:06

## 2020-02-25 RX ADMIN — OCTREOTIDE ACETATE 100 MCG: 500 INJECTION, SOLUTION INTRAVENOUS; SUBCUTANEOUS at 08:06

## 2020-02-26 PROBLEM — R18.8 CIRRHOSIS OF LIVER WITH ASCITES (HCC): Status: ACTIVE | Noted: 2020-02-23

## 2020-02-26 PROBLEM — K74.60 CIRRHOSIS OF LIVER WITH ASCITES (HCC): Status: ACTIVE | Noted: 2020-02-23

## 2020-02-26 LAB
ALBUMIN SERPL-MCNC: 3.3 G/DL (ref 3.5–5.2)
ALBUMIN/GLOB SERPL: 1 G/DL
ALP SERPL-CCNC: 183 U/L (ref 39–117)
ALT SERPL W P-5'-P-CCNC: 45 U/L (ref 1–41)
ANION GAP SERPL CALCULATED.3IONS-SCNC: 13 MMOL/L (ref 5–15)
APPEARANCE FLD: CLEAR
AST SERPL-CCNC: 92 U/L (ref 1–40)
BILIRUB SERPL-MCNC: 1 MG/DL
BILIRUB SERPL-MCNC: 10.1 MG/DL (ref 0.2–1.2)
BUN BLD-MCNC: 60 MG/DL (ref 6–20)
BUN/CREAT SERPL: 38.2 (ref 7–25)
CALCIUM SPEC-SCNC: 8.3 MG/DL (ref 8.6–10.5)
CHLORIDE SERPL-SCNC: 92 MMOL/L (ref 98–107)
CO2 SERPL-SCNC: 27 MMOL/L (ref 22–29)
COLOR FLD: YELLOW
CREAT BLD-MCNC: 1.57 MG/DL (ref 0.76–1.27)
DEPRECATED RDW RBC AUTO: 71.1 FL (ref 37–54)
ERYTHROCYTE [DISTWIDTH] IN BLOOD BY AUTOMATED COUNT: 16.6 % (ref 12.3–15.4)
GFR SERPL CREATININE-BSD FRML MDRD: 46 ML/MIN/1.73
GLOBULIN UR ELPH-MCNC: 3.4 GM/DL
GLUCOSE BLD-MCNC: 162 MG/DL (ref 65–99)
HCT VFR BLD AUTO: 23.4 % (ref 37.5–51)
HGB BLD-MCNC: 8.2 G/DL (ref 13–17.7)
INR PPP: 2.23 (ref 0.85–1.16)
LYMPHOCYTES NFR FLD MANUAL: 25 %
MACROPHAGE FLUID: 20 %
MCH RBC QN AUTO: 41.2 PG (ref 26.6–33)
MCHC RBC AUTO-ENTMCNC: 35 G/DL (ref 31.5–35.7)
MCV RBC AUTO: 117.6 FL (ref 79–97)
MESOTHL CELL NFR FLD MANUAL: 15 %
MONOCYTES NFR FLD: 30 %
NEUTROPHILS NFR FLD MANUAL: 10 %
PLATELET # BLD AUTO: 158 10*3/MM3 (ref 140–450)
PMV BLD AUTO: 10 FL (ref 6–12)
POTASSIUM BLD-SCNC: 3.6 MMOL/L (ref 3.5–5.2)
PROT SERPL-MCNC: 6.7 G/DL (ref 6–8.5)
PROTHROMBIN TIME: 24.4 SECONDS (ref 11.5–14)
RBC # BLD AUTO: 1.99 10*6/MM3 (ref 4.14–5.8)
RBC # FLD AUTO: <2000 /MM3
SODIUM BLD-SCNC: 132 MMOL/L (ref 136–145)
WBC # FLD AUTO: 140 /MM3
WBC NRBC COR # BLD: 18.27 10*3/MM3 (ref 3.4–10.8)

## 2020-02-26 PROCEDURE — 90632 HEPA VACCINE ADULT IM: CPT | Performed by: PHYSICIAN ASSISTANT

## 2020-02-26 PROCEDURE — 90740 HEPB VACC 3 DOSE IMMUNSUP IM: CPT | Performed by: PHYSICIAN ASSISTANT

## 2020-02-26 PROCEDURE — 90471 IMMUNIZATION ADMIN: CPT | Performed by: PHYSICIAN ASSISTANT

## 2020-02-26 PROCEDURE — 99232 SBSQ HOSP IP/OBS MODERATE 35: CPT | Performed by: INTERNAL MEDICINE

## 2020-02-26 PROCEDURE — 85610 PROTHROMBIN TIME: CPT | Performed by: PHYSICIAN ASSISTANT

## 2020-02-26 PROCEDURE — 99232 SBSQ HOSP IP/OBS MODERATE 35: CPT | Performed by: PHYSICIAN ASSISTANT

## 2020-02-26 PROCEDURE — 85027 COMPLETE CBC AUTOMATED: CPT | Performed by: INTERNAL MEDICINE

## 2020-02-26 PROCEDURE — 25010000002 HEPATITIS A 1440 EL U/ML SUSPENSION: Performed by: PHYSICIAN ASSISTANT

## 2020-02-26 PROCEDURE — 80053 COMPREHEN METABOLIC PANEL: CPT | Performed by: INTERNAL MEDICINE

## 2020-02-26 PROCEDURE — 25010000002 HEPATITIS B VACCINE (RECOMBINANT) 20 MCG/ML SUSPENSION: Performed by: PHYSICIAN ASSISTANT

## 2020-02-26 PROCEDURE — G0010 ADMIN HEPATITIS B VACCINE: HCPCS | Performed by: PHYSICIAN ASSISTANT

## 2020-02-26 RX ADMIN — MULTIPLE VITAMINS W/ MINERALS TAB 1 TABLET: TAB at 08:23

## 2020-02-26 RX ADMIN — HEPATITIS B VACCINE (RECOMBINANT) 20 MCG: 20 INJECTION, SUSPENSION INTRAMUSCULAR at 12:22

## 2020-02-26 RX ADMIN — MIDODRINE HYDROCHLORIDE 7.5 MG: 5 TABLET ORAL at 17:37

## 2020-02-26 RX ADMIN — MELATONIN TAB 5 MG 10 MG: 5 TAB at 20:13

## 2020-02-26 RX ADMIN — HEPATITIS A VACCINE 1440 UNITS: 1440 INJECTION, SUSPENSION INTRAMUSCULAR at 12:23

## 2020-02-26 RX ADMIN — MIDODRINE HYDROCHLORIDE 7.5 MG: 5 TABLET ORAL at 12:21

## 2020-02-26 RX ADMIN — SODIUM CHLORIDE, PRESERVATIVE FREE 10 ML: 5 INJECTION INTRAVENOUS at 08:22

## 2020-02-26 RX ADMIN — THIAMINE HCL TAB 100 MG 200 MG: 100 TAB at 08:23

## 2020-02-26 RX ADMIN — MIDODRINE HYDROCHLORIDE 7.5 MG: 5 TABLET ORAL at 08:23

## 2020-02-26 RX ADMIN — Medication 220 MG: at 08:23

## 2020-02-26 RX ADMIN — SODIUM CHLORIDE, PRESERVATIVE FREE 10 ML: 5 INJECTION INTRAVENOUS at 20:13

## 2020-02-27 ENCOUNTER — ANESTHESIA (OUTPATIENT)
Dept: GASTROENTEROLOGY | Facility: HOSPITAL | Age: 55
End: 2020-02-27

## 2020-02-27 ENCOUNTER — ANESTHESIA EVENT (OUTPATIENT)
Dept: GASTROENTEROLOGY | Facility: HOSPITAL | Age: 55
End: 2020-02-27

## 2020-02-27 LAB
ALBUMIN SERPL-MCNC: 3.4 G/DL (ref 3.5–5.2)
ALBUMIN/GLOB SERPL: 0.9 G/DL
ALP SERPL-CCNC: 236 U/L (ref 39–117)
ALT SERPL W P-5'-P-CCNC: 51 U/L (ref 1–41)
ANION GAP SERPL CALCULATED.3IONS-SCNC: 13 MMOL/L (ref 5–15)
AST SERPL-CCNC: 111 U/L (ref 1–40)
BILIRUB SERPL-MCNC: 10.5 MG/DL (ref 0.2–1.2)
BUN BLD-MCNC: 59 MG/DL (ref 6–20)
BUN/CREAT SERPL: 32.1 (ref 7–25)
CALCIUM SPEC-SCNC: 8.4 MG/DL (ref 8.6–10.5)
CHLORIDE SERPL-SCNC: 88 MMOL/L (ref 98–107)
CO2 SERPL-SCNC: 28 MMOL/L (ref 22–29)
CREAT BLD-MCNC: 1.84 MG/DL (ref 0.76–1.27)
GFR SERPL CREATININE-BSD FRML MDRD: 39 ML/MIN/1.73
GLOBULIN UR ELPH-MCNC: 3.6 GM/DL
GLUCOSE BLD-MCNC: 127 MG/DL (ref 65–99)
INR PPP: 2.12 (ref 0.85–1.16)
POTASSIUM BLD-SCNC: 3.9 MMOL/L (ref 3.5–5.2)
PROT SERPL-MCNC: 7 G/DL (ref 6–8.5)
PROTHROMBIN TIME: 23.4 SECONDS (ref 11.5–14)
SODIUM BLD-SCNC: 129 MMOL/L (ref 136–145)

## 2020-02-27 PROCEDURE — 25010000002 PROPOFOL 10 MG/ML EMULSION: Performed by: NURSE ANESTHETIST, CERTIFIED REGISTERED

## 2020-02-27 PROCEDURE — 0DJ08ZZ INSPECTION OF UPPER INTESTINAL TRACT, VIA NATURAL OR ARTIFICIAL OPENING ENDOSCOPIC: ICD-10-PCS | Performed by: INTERNAL MEDICINE

## 2020-02-27 PROCEDURE — C1769 GUIDE WIRE: HCPCS | Performed by: INTERNAL MEDICINE

## 2020-02-27 PROCEDURE — 97161 PT EVAL LOW COMPLEX 20 MIN: CPT

## 2020-02-27 PROCEDURE — 80053 COMPREHEN METABOLIC PANEL: CPT | Performed by: INTERNAL MEDICINE

## 2020-02-27 PROCEDURE — 85610 PROTHROMBIN TIME: CPT | Performed by: PHYSICIAN ASSISTANT

## 2020-02-27 PROCEDURE — 25010000003 LIDOCAINE 1 % SOLUTION: Performed by: NURSE ANESTHETIST, CERTIFIED REGISTERED

## 2020-02-27 PROCEDURE — 99232 SBSQ HOSP IP/OBS MODERATE 35: CPT | Performed by: INTERNAL MEDICINE

## 2020-02-27 RX ORDER — SODIUM CHLORIDE 0.9 % (FLUSH) 0.9 %
10 SYRINGE (ML) INJECTION AS NEEDED
Status: DISCONTINUED | OUTPATIENT
Start: 2020-02-27 | End: 2020-02-27 | Stop reason: HOSPADM

## 2020-02-27 RX ORDER — CARVEDILOL 6.25 MG/1
6.25 TABLET ORAL 2 TIMES DAILY WITH MEALS
Status: DISCONTINUED | OUTPATIENT
Start: 2020-02-27 | End: 2020-02-28

## 2020-02-27 RX ORDER — LIDOCAINE HYDROCHLORIDE 10 MG/ML
INJECTION, SOLUTION INFILTRATION; PERINEURAL AS NEEDED
Status: DISCONTINUED | OUTPATIENT
Start: 2020-02-27 | End: 2020-02-27 | Stop reason: SURG

## 2020-02-27 RX ORDER — SODIUM CHLORIDE, SODIUM LACTATE, POTASSIUM CHLORIDE, CALCIUM CHLORIDE 600; 310; 30; 20 MG/100ML; MG/100ML; MG/100ML; MG/100ML
9 INJECTION, SOLUTION INTRAVENOUS CONTINUOUS
Status: DISCONTINUED | OUTPATIENT
Start: 2020-02-27 | End: 2020-02-29

## 2020-02-27 RX ORDER — FAMOTIDINE 10 MG/ML
20 INJECTION, SOLUTION INTRAVENOUS ONCE
Status: COMPLETED | OUTPATIENT
Start: 2020-02-27 | End: 2020-02-27

## 2020-02-27 RX ORDER — SODIUM CHLORIDE 0.9 % (FLUSH) 0.9 %
10 SYRINGE (ML) INJECTION EVERY 12 HOURS SCHEDULED
Status: DISCONTINUED | OUTPATIENT
Start: 2020-02-27 | End: 2020-02-27 | Stop reason: HOSPADM

## 2020-02-27 RX ORDER — LIDOCAINE HYDROCHLORIDE 10 MG/ML
0.5 INJECTION, SOLUTION EPIDURAL; INFILTRATION; INTRACAUDAL; PERINEURAL ONCE AS NEEDED
Status: DISCONTINUED | OUTPATIENT
Start: 2020-02-27 | End: 2020-02-27 | Stop reason: HOSPADM

## 2020-02-27 RX ORDER — FAMOTIDINE 20 MG/1
20 TABLET, FILM COATED ORAL ONCE
Status: DISCONTINUED | OUTPATIENT
Start: 2020-02-27 | End: 2020-02-27 | Stop reason: HOSPADM

## 2020-02-27 RX ORDER — PROPOFOL 10 MG/ML
VIAL (ML) INTRAVENOUS AS NEEDED
Status: DISCONTINUED | OUTPATIENT
Start: 2020-02-27 | End: 2020-02-27 | Stop reason: SURG

## 2020-02-27 RX ADMIN — SODIUM CHLORIDE, POTASSIUM CHLORIDE, SODIUM LACTATE AND CALCIUM CHLORIDE: 600; 310; 30; 20 INJECTION, SOLUTION INTRAVENOUS at 09:28

## 2020-02-27 RX ADMIN — SODIUM CHLORIDE, PRESERVATIVE FREE 10 ML: 5 INJECTION INTRAVENOUS at 20:11

## 2020-02-27 RX ADMIN — SODIUM CHLORIDE, PRESERVATIVE FREE 10 ML: 5 INJECTION INTRAVENOUS at 10:45

## 2020-02-27 RX ADMIN — MIDODRINE HYDROCHLORIDE 7.5 MG: 5 TABLET ORAL at 17:06

## 2020-02-27 RX ADMIN — LIDOCAINE HYDROCHLORIDE 100 MG: 10 INJECTION, SOLUTION INFILTRATION; PERINEURAL at 09:36

## 2020-02-27 RX ADMIN — PROPOFOL 100 MG: 10 INJECTION, EMULSION INTRAVENOUS at 09:36

## 2020-02-27 RX ADMIN — MIDODRINE HYDROCHLORIDE 7.5 MG: 5 TABLET ORAL at 10:45

## 2020-02-27 RX ADMIN — MELATONIN TAB 5 MG 10 MG: 5 TAB at 20:12

## 2020-02-27 RX ADMIN — FAMOTIDINE 20 MG: 10 INJECTION INTRAVENOUS at 08:34

## 2020-02-27 RX ADMIN — CARVEDILOL 6.25 MG: 6.25 TABLET, FILM COATED ORAL at 10:45

## 2020-02-27 RX ADMIN — CARVEDILOL 6.25 MG: 6.25 TABLET, FILM COATED ORAL at 17:06

## 2020-02-28 LAB
ANION GAP SERPL CALCULATED.3IONS-SCNC: 12 MMOL/L (ref 5–15)
BACTERIA FLD CULT: NORMAL
BACTERIA SPEC AEROBE CULT: NORMAL
BACTERIA SPEC AEROBE CULT: NORMAL
BUN BLD-MCNC: 73 MG/DL (ref 6–20)
BUN/CREAT SERPL: 36.3 (ref 7–25)
CALCIUM SPEC-SCNC: 8.2 MG/DL (ref 8.6–10.5)
CHLORIDE SERPL-SCNC: 90 MMOL/L (ref 98–107)
CO2 SERPL-SCNC: 26 MMOL/L (ref 22–29)
CREAT BLD-MCNC: 2.01 MG/DL (ref 0.76–1.27)
GFR SERPL CREATININE-BSD FRML MDRD: 35 ML/MIN/1.73
GLUCOSE BLD-MCNC: 130 MG/DL (ref 65–99)
GRAM STN SPEC: NORMAL
LAB AP CASE REPORT: NORMAL
PATH REPORT.FINAL DX SPEC: NORMAL
POTASSIUM BLD-SCNC: 4.1 MMOL/L (ref 3.5–5.2)
SODIUM BLD-SCNC: 128 MMOL/L (ref 136–145)

## 2020-02-28 PROCEDURE — 99232 SBSQ HOSP IP/OBS MODERATE 35: CPT | Performed by: INTERNAL MEDICINE

## 2020-02-28 PROCEDURE — 80048 BASIC METABOLIC PNL TOTAL CA: CPT | Performed by: INTERNAL MEDICINE

## 2020-02-28 PROCEDURE — 99232 SBSQ HOSP IP/OBS MODERATE 35: CPT | Performed by: PHYSICIAN ASSISTANT

## 2020-02-28 PROCEDURE — 25010000002 ALBUMIN HUMAN 25% PER 50 ML: Performed by: PHYSICIAN ASSISTANT

## 2020-02-28 PROCEDURE — P9047 ALBUMIN (HUMAN), 25%, 50ML: HCPCS | Performed by: PHYSICIAN ASSISTANT

## 2020-02-28 RX ORDER — BISACODYL 10 MG
10 SUPPOSITORY, RECTAL RECTAL ONCE
Status: COMPLETED | OUTPATIENT
Start: 2020-02-28 | End: 2020-02-28

## 2020-02-28 RX ORDER — ALBUMIN (HUMAN) 12.5 G/50ML
50 SOLUTION INTRAVENOUS ONCE
Status: COMPLETED | OUTPATIENT
Start: 2020-02-28 | End: 2020-02-28

## 2020-02-28 RX ORDER — LACTULOSE 10 G/15ML
20 SOLUTION ORAL 3 TIMES DAILY PRN
Status: DISCONTINUED | OUTPATIENT
Start: 2020-02-28 | End: 2020-03-01 | Stop reason: HOSPADM

## 2020-02-28 RX ORDER — SODIUM CHLORIDE 9 MG/ML
75 INJECTION, SOLUTION INTRAVENOUS CONTINUOUS
Status: DISCONTINUED | OUTPATIENT
Start: 2020-02-28 | End: 2020-02-29

## 2020-02-28 RX ADMIN — HYDROCORTISONE: 25 CREAM TOPICAL at 14:27

## 2020-02-28 RX ADMIN — SODIUM CHLORIDE, PRESERVATIVE FREE 10 ML: 5 INJECTION INTRAVENOUS at 21:28

## 2020-02-28 RX ADMIN — BISACODYL 10 MG: 10 SUPPOSITORY RECTAL at 11:19

## 2020-02-28 RX ADMIN — DOCUSATE SODIUM 100 MG: 100 CAPSULE, LIQUID FILLED ORAL at 06:21

## 2020-02-28 RX ADMIN — ALBUMIN HUMAN 50 G: 0.25 SOLUTION INTRAVENOUS at 12:51

## 2020-02-28 RX ADMIN — MIDODRINE HYDROCHLORIDE 7.5 MG: 5 TABLET ORAL at 16:35

## 2020-02-28 RX ADMIN — SODIUM CHLORIDE 75 ML/HR: 9 INJECTION, SOLUTION INTRAVENOUS at 16:35

## 2020-02-28 RX ADMIN — SODIUM CHLORIDE, PRESERVATIVE FREE 10 ML: 5 INJECTION INTRAVENOUS at 11:18

## 2020-02-28 RX ADMIN — THIAMINE HCL TAB 100 MG 200 MG: 100 TAB at 08:38

## 2020-02-28 RX ADMIN — MELATONIN TAB 5 MG 10 MG: 5 TAB at 21:28

## 2020-02-28 RX ADMIN — MIDODRINE HYDROCHLORIDE 7.5 MG: 5 TABLET ORAL at 11:19

## 2020-02-28 RX ADMIN — MULTIPLE VITAMINS W/ MINERALS TAB 1 TABLET: TAB at 08:38

## 2020-02-28 RX ADMIN — Medication 220 MG: at 08:39

## 2020-02-28 RX ADMIN — MIDODRINE HYDROCHLORIDE 7.5 MG: 5 TABLET ORAL at 07:17

## 2020-02-29 LAB
ANION GAP SERPL CALCULATED.3IONS-SCNC: 14 MMOL/L (ref 5–15)
BUN BLD-MCNC: 77 MG/DL (ref 6–20)
BUN/CREAT SERPL: 42.1 (ref 7–25)
CALCIUM SPEC-SCNC: 8.1 MG/DL (ref 8.6–10.5)
CHLORIDE SERPL-SCNC: 92 MMOL/L (ref 98–107)
CO2 SERPL-SCNC: 23 MMOL/L (ref 22–29)
CREAT BLD-MCNC: 1.83 MG/DL (ref 0.76–1.27)
GFR SERPL CREATININE-BSD FRML MDRD: 39 ML/MIN/1.73
GLUCOSE BLD-MCNC: 127 MG/DL (ref 65–99)
POTASSIUM BLD-SCNC: 3.9 MMOL/L (ref 3.5–5.2)
SODIUM BLD-SCNC: 129 MMOL/L (ref 136–145)

## 2020-02-29 PROCEDURE — 99232 SBSQ HOSP IP/OBS MODERATE 35: CPT | Performed by: FAMILY MEDICINE

## 2020-02-29 PROCEDURE — 80048 BASIC METABOLIC PNL TOTAL CA: CPT | Performed by: INTERNAL MEDICINE

## 2020-02-29 PROCEDURE — 99232 SBSQ HOSP IP/OBS MODERATE 35: CPT | Performed by: INTERNAL MEDICINE

## 2020-02-29 RX ORDER — ALBUMIN (HUMAN) 12.5 G/50ML
50 SOLUTION INTRAVENOUS ONCE
Status: COMPLETED | OUTPATIENT
Start: 2020-03-01 | End: 2020-03-01

## 2020-02-29 RX ORDER — SODIUM CHLORIDE 9 MG/ML
75 INJECTION, SOLUTION INTRAVENOUS CONTINUOUS
Status: ACTIVE | OUTPATIENT
Start: 2020-02-29 | End: 2020-02-29

## 2020-02-29 RX ADMIN — MIDODRINE HYDROCHLORIDE 7.5 MG: 5 TABLET ORAL at 11:24

## 2020-02-29 RX ADMIN — MULTIPLE VITAMINS W/ MINERALS TAB 1 TABLET: TAB at 08:16

## 2020-02-29 RX ADMIN — MELATONIN TAB 5 MG 10 MG: 5 TAB at 22:42

## 2020-02-29 RX ADMIN — Medication 220 MG: at 08:19

## 2020-02-29 RX ADMIN — THIAMINE HCL TAB 100 MG 200 MG: 100 TAB at 08:16

## 2020-02-29 RX ADMIN — MIDODRINE HYDROCHLORIDE 7.5 MG: 5 TABLET ORAL at 08:17

## 2020-02-29 RX ADMIN — SODIUM CHLORIDE 75 ML/HR: 9 INJECTION, SOLUTION INTRAVENOUS at 06:04

## 2020-02-29 RX ADMIN — MIDODRINE HYDROCHLORIDE 7.5 MG: 5 TABLET ORAL at 17:00

## 2020-02-29 RX ADMIN — SODIUM CHLORIDE 75 ML/HR: 9 INJECTION, SOLUTION INTRAVENOUS at 11:40

## 2020-03-01 VITALS
HEART RATE: 90 BPM | OXYGEN SATURATION: 98 % | BODY MASS INDEX: 29.62 KG/M2 | RESPIRATION RATE: 16 BRPM | TEMPERATURE: 98.5 F | WEIGHT: 200 LBS | DIASTOLIC BLOOD PRESSURE: 88 MMHG | SYSTOLIC BLOOD PRESSURE: 152 MMHG | HEIGHT: 69 IN

## 2020-03-01 PROBLEM — N17.9 ACUTE RENAL FAILURE (ARF) (HCC): Status: RESOLVED | Noted: 2020-02-23 | Resolved: 2020-03-01

## 2020-03-01 PROBLEM — E87.1 HYPONATREMIA: Status: RESOLVED | Noted: 2020-02-24 | Resolved: 2020-03-01

## 2020-03-01 LAB
APPEARANCE FLD: ABNORMAL
COLOR FLD: YELLOW
LYMPHOCYTES NFR FLD MANUAL: 30 %
MACROPHAGE FLUID: 47 %
MESOTHL CELL NFR FLD MANUAL: 16 %
MONOCYTES NFR FLD: 2 %
NEUTROPHILS NFR FLD MANUAL: 5 %
RBC # FLD AUTO: <2000 /MM3
WBC # FLD AUTO: 178 /MM3

## 2020-03-01 PROCEDURE — 89051 BODY FLUID CELL COUNT: CPT | Performed by: INTERNAL MEDICINE

## 2020-03-01 PROCEDURE — 87205 SMEAR GRAM STAIN: CPT | Performed by: INTERNAL MEDICINE

## 2020-03-01 PROCEDURE — 87070 CULTURE OTHR SPECIMN AEROBIC: CPT | Performed by: INTERNAL MEDICINE

## 2020-03-01 PROCEDURE — P9047 ALBUMIN (HUMAN), 25%, 50ML: HCPCS | Performed by: INTERNAL MEDICINE

## 2020-03-01 PROCEDURE — 49082 ABD PARACENTESIS: CPT | Performed by: INTERNAL MEDICINE

## 2020-03-01 PROCEDURE — 99239 HOSP IP/OBS DSCHRG MGMT >30: CPT | Performed by: FAMILY MEDICINE

## 2020-03-01 PROCEDURE — 25010000002 ALBUMIN HUMAN 25% PER 50 ML: Performed by: INTERNAL MEDICINE

## 2020-03-01 RX ORDER — MIDODRINE HYDROCHLORIDE 5 MG/1
5 TABLET ORAL 3 TIMES DAILY
Qty: 90 TABLET | Refills: 1 | Status: SHIPPED | OUTPATIENT
Start: 2020-03-01 | End: 2020-03-02

## 2020-03-01 RX ORDER — MIDODRINE HYDROCHLORIDE 5 MG/1
7.5 TABLET ORAL 3 TIMES DAILY
Status: DISCONTINUED | OUTPATIENT
Start: 2020-03-01 | End: 2020-03-01 | Stop reason: HOSPADM

## 2020-03-01 RX ORDER — LACTULOSE 10 G/15ML
20 SOLUTION ORAL 3 TIMES DAILY PRN
Qty: 473 ML | Refills: 1 | Status: SHIPPED | OUTPATIENT
Start: 2020-03-01 | End: 2020-03-02

## 2020-03-01 RX ADMIN — MULTIPLE VITAMINS W/ MINERALS TAB 1 TABLET: TAB at 08:43

## 2020-03-01 RX ADMIN — ALBUMIN HUMAN 50 G: 0.25 SOLUTION INTRAVENOUS at 08:44

## 2020-03-01 RX ADMIN — THIAMINE HCL TAB 100 MG 200 MG: 100 TAB at 08:43

## 2020-03-01 RX ADMIN — MIDODRINE HYDROCHLORIDE 7.5 MG: 5 TABLET ORAL at 08:42

## 2020-03-01 RX ADMIN — Medication 220 MG: at 09:01

## 2020-03-01 RX ADMIN — SODIUM CHLORIDE, PRESERVATIVE FREE 10 ML: 5 INJECTION INTRAVENOUS at 09:01

## 2020-03-01 NOTE — PROGRESS NOTES
"NAL Progress Note  Unruly Liang  [id number]  1965 2/23/2020    Reason for visit:    No complaints  No overnight events    ROS:    Pulm:  No SOA  CV:  No CP    I&O:    Intake/Output Summary (Last 24 hours) at 3/1/2020 0915  Last data filed at 2/29/2020 1829  Gross per 24 hour   Intake 1095 ml   Output --   Net 1095 ml       PE:   Blood pressure 143/85, pulse 90, temperature 98.7 °F (37.1 °C), temperature source Oral, resp. rate 16, height 175.3 cm (69\"), weight 90.7 kg (200 lb), SpO2 98 %.    GENERAL: Awake and alert, in no acute distress.   HEENT:  No conjunctivitis, yellow sclera  NECK: Supple, no JVD   HEART: RRR; No murmur, rubs, gallops.   LUNGS: Clear to auscultation bilaterally, Normal respiratory effort. Nonlabored.  ABDOMEN: Soft, nontender, distended  Ascites.   EXT:  No cyanosis, clubbing or edema.   NEURO: Alert, NFD noted grossly    Medications:    Current Facility-Administered Medications:   •  docusate sodium (COLACE) capsule 100 mg, 100 mg, Oral, BID PRN, Charly Lazo MD, 100 mg at 02/28/20 0621  •  famotidine (PEPCID) tablet 20 mg, 20 mg, Oral, BID PRN, Charly Lazo MD  •  hydrocortisone (ANUSOL-HC) 2.5 % rectal cream, , Rectal, BID, Elo Miner MD  •  lactulose (CHRONULAC) 10 GM/15ML solution 20 g, 20 g, Oral, TID PRN, Elo Miner MD  •  melatonin tablet 10 mg, 10 mg, Oral, Nightly, Charly Lazo MD, 10 mg at 02/29/20 2242  •  midodrine (PROAMATINE) tablet 7.5 mg, 7.5 mg, Oral, TID, Ildefonso Sanchez ALEAH, 7.5 mg at 03/01/20 0842  •  morphine injection 1 mg, 1 mg, Intravenous, Q4H PRN **AND** naloxone (NARCAN) injection 0.4 mg, 0.4 mg, Intravenous, Q5 Min PRN, Charly Lazo MD  •  multivitamin with minerals 1 tablet, 1 tablet, Oral, Daily, Charly Lazo MD, 1 tablet at 03/01/20 0843  •  ondansetron (ZOFRAN) tablet 4 mg, 4 mg, Oral, Q6H PRN, 4 mg at 02/24/20 2222 **OR** ondansetron (ZOFRAN) injection 4 mg, 4 mg, Intravenous, Q6H PRN, Charly Lazo MD  •  pneumococcal " polysaccharide 23-valent (PNEUMOVAX-23) vaccine 0.5 mL, 0.5 mL, Intramuscular, During Hospitalization, Charly Lazo MD  •  sodium chloride 0.9 % flush 10 mL, 10 mL, Intravenous, Q12H, Charly Lazo MD, 10 mL at 03/01/20 0901  •  sodium chloride 0.9 % flush 10 mL, 10 mL, Intravenous, PRN, Charly Lazo MD  •  thiamine (VITAMIN B-1) tablet 200 mg, 200 mg, Oral, Daily, Charly Lazo MD, 200 mg at 03/01/20 0843  •  traMADol (ULTRAM) tablet 50 mg, 50 mg, Oral, Q6H PRN, Charly Lazo MD  •  zinc sulfate (ZINCATE) capsule 220 mg, 220 mg, Oral, Daily, Charly Lazo MD, 220 mg at 03/01/20 0901    Meds reviewed and doses adjusted for renal function    Labs:  Results from last 7 days   Lab Units 02/29/20  0550 02/28/20  0653 02/27/20  0645 02/26/20  0537 02/25/20  0455 02/25/20  0454 02/24/20  0513 02/23/20  1210   WBC 10*3/mm3  --   --   --  18.27* 19.62*  --  20.08* 22.39*   HEMOGLOBIN g/dL  --   --   --  8.2* 8.9*  --  8.3* 10.2*   HEMATOCRIT %  --   --   --  23.4* 25.2*  --  23.5* 28.9*   PLATELETS 10*3/mm3  --   --   --  158 173  --  198 285   SODIUM mmol/L 129* 128* 129* 132*  --  133* 130* 127*   POTASSIUM mmol/L 3.9 4.1 3.9 3.6  --  3.7 3.7 3.9   CHLORIDE mmol/L 92* 90* 88* 92*  --  92* 90* 84*   CO2 mmol/L 23.0 26.0 28.0 27.0  --  27.0 27.0 28.0   BUN mg/dL 77* 73* 59* 60*  --  68* 74* 82*   CREATININE mg/dL 1.83* 2.01* 1.84* 1.57*  --  1.89* 2.26* 2.66*   GLUCOSE mg/dL 127* 130* 127* 162*  --  123* 147* 143*   CALCIUM mg/dL 8.1* 8.2* 8.4* 8.3*  --  8.6 8.1* 8.6             Radiology:  Imaging Results (Last 72 Hours)     ** No results found for the last 72 hours. **          Renal Imaging:  No radiology results for the last day        IMPRESSION:  ENEDELIA; Likely ATN vs Pre-renal/hepatorenal syndrome- Non oliguric - worsening   ? CKD  Hyponatremia- sec to excess vol and liver failure poor solute intake  Liver failure/cirrhosis sec to alcohol  Leukocytosis- SBP VS UTI  Renal lesion- will need further  eval and renal mass protocol ct once stable. Renal Ultrasound - 2.6 cm simple renal cortical cyst.       RECOMMENDATIONS:  Fluid restriction.  Continue with midodrine.   Continue to avoid nephrotoxic agents and adjust meds for crcl  Paracentesis today. Albumin with procedure.     Follow up with [facility] in 2 weeks with renal function panel and UA 1 week prior to appointment.       Miller Lucio MD  3/1/2020  9:15 AM

## 2020-03-01 NOTE — PLAN OF CARE
Problem: Patient Care Overview  Goal: Plan of Care Review  Outcome: Ongoing (interventions implemented as appropriate)  Flowsheets (Taken 3/1/2020 1421)  Plan of Care Reviewed With: patient  Outcome Summary: Pt VSS. Pt had paracentesis this AM with 7,000ml out. Denies pain, nausea. Pt to be discharged today.     Problem: Paracentesis, Abdominal (Adult)  Goal: Signs and Symptoms of Listed Potential Problems Will be Absent, Minimized or Managed (Paracentesis, Abdominal)  Outcome: Ongoing (interventions implemented as appropriate)     Problem: Liver Failure, Acute/Chronic (Adult)  Goal: Signs and Symptoms of Listed Potential Problems Will be Absent, Minimized or Managed (Liver Failure, Acute/Chronic)  Outcome: Ongoing (interventions implemented as appropriate)

## 2020-03-01 NOTE — PLAN OF CARE
Problem: Patient Care Overview  Goal: Plan of Care Review  Flowsheets  Taken 3/1/2020 0351  Progress: improving  Outcome Summary: VSS, room air. Paracentesis in AM. Eager to go home. Will continue to monitor.  Taken 3/1/2020 0000  Plan of Care Reviewed With: patient

## 2020-03-01 NOTE — PROGRESS NOTES
"GI Daily Progress Note  Subjective:    Chief Complaint:  F/u decompensated alcohol cirrhosis.    The patient has been ambulating.  He is eating well.  Has reaccumulation of ascites fluid.  Denies abdominal pain, nausea, confusion, or GI bleeding.    Objective:    /72 (BP Location: Right arm, Patient Position: Lying)   Pulse 102   Temp 98.1 °F (36.7 °C) (Oral)   Resp 16   Ht 175.3 cm (69\")   Wt 90.7 kg (200 lb)   SpO2 98%   BMI 29.53 kg/m²     Physical Exam   Constitutional: He is oriented to person, place, and time. He appears well-developed. No distress.   Appears chronically ill   HENT:   Head: Normocephalic.   Mouth/Throat: No oropharyngeal exudate.   Eyes: Conjunctivae are normal. Scleral icterus is present.   Neck: Normal range of motion.   Cardiovascular: Normal rate and regular rhythm.   Pulmonary/Chest: Effort normal and breath sounds normal. No stridor. No respiratory distress. He has no wheezes. He has no rales.   Abdominal: Soft. Bowel sounds are normal. He exhibits distension. He exhibits no mass. There is no tenderness. There is no guarding.   Large ascites   Genitourinary:   Genitourinary Comments: Deferred   Musculoskeletal: Normal range of motion. He exhibits no edema.   Sarcopenia   Neurological: He is alert and oriented to person, place, and time.   Skin: Skin is warm and dry. Capillary refill takes less than 2 seconds. No rash noted.   Psychiatric: He has a normal mood and affect. His behavior is normal.   Nursing note and vitals reviewed.      Lab  Lab Results   Component Value Date    WBC 18.27 (H) 02/26/2020    HGB 8.2 (L) 02/26/2020    HGB 8.9 (L) 02/25/2020    HGB 8.3 (L) 02/24/2020    .6 (H) 02/26/2020     02/26/2020    INR 2.12 (H) 02/27/2020    INR 2.23 (H) 02/26/2020    INR 2.10 (H) 02/25/2020    INR 2.17 (H) 02/24/2020    INR 2.00 (H) 02/23/2020       Lab Results   Component Value Date    GLUCOSE 127 (H) 02/29/2020    BUN 77 (H) 02/29/2020    CREATININE 1.83 " (H) 02/29/2020    EGFRIFNONA 39 (L) 02/29/2020    BCR 42.1 (H) 02/29/2020     (L) 02/29/2020    K 3.9 02/29/2020    CO2 23.0 02/29/2020    CALCIUM 8.1 (L) 02/29/2020    ALBUMIN 3.40 (L) 02/27/2020    ALKPHOS 236 (H) 02/27/2020    BILITOT 10.5 (H) 02/27/2020    ALT 51 (H) 02/27/2020     (H) 02/27/2020       Assessment:    Acute alcoholic hepatitis, severe.  Alcohol cirrhosis with ascites.  Coagulopathy.  Sarcopenia.  Type II hepatorenal syndrome.  Creatinine improved after albumin infusion.    Plan:    >> Plan bedside paracentesis tomorrow  >> Anticipate home tomorrow evening or Monday morning  >> Needs scheduled paracentesis weekly at the time of discharge in interventional radiology.    Mark I. Brunner, MD  02/29/20  7:50 PM

## 2020-03-01 NOTE — DISCHARGE SUMMARY
[facility] Medicine Services  Short Stay Unit  DISCHARGE SUMMARY    Patient Name: Unruly Liang  : 1965  MRN: [id number]    Admission Date and Time: 2020 12:00 PM  Discharge Date and Time:  3/1/2020    Primary Care Physician: Provider, No Known    Hospital Course     Active Hospital Problems    Diagnosis  POA   • **Alcoholic hepatitis with ascites [K70.11]  Yes   • Severe malnutrition (CMS/HCC) [E43]  Yes   • Alcoholic cirrhosis of liver with ascites (CMS/HCC) [K70.31]  Yes   • Alcoholism /alcohol abuse (CMS/HCC) [F10.20]  Yes   • Possible Renal lesion on CT [N28.9]  Yes   • Macrocytic anemia [D53.9]  Yes   • Coagulopathy (CMS/HCC) [D68.9]  Yes   • Leukocytosis [D72.829]  Yes   • SIRS (systemic inflammatory response syndrome) (CMS/HCC) [R65.10]  Yes      Resolved Hospital Problems    Diagnosis Date Resolved POA   • Hyponatremia [E87.1] 2020 Yes   • Acute renal failure (ARF) (CMS/HCC) [N17.9] 2020 Yes          Brief SSU Course:  Unruly Liang is a 54 y.o. male with history of heavy alcohol use presents with progressive ascites and jaundice.  Found to have elevated LFTs.     Alcoholic hepatitis cirrhosis with ascites  Esophageal varices  -serial paracentesis ---> 7L tap at bedside on , 8L on  and 7L on 3/1.  -Ascites quickly reaccumulates -->  GI has made plans for outpt serial paracentesis standing order setup via [facility].   -EGD 2020 with grade II varices with stigmata of recent bleeding and red whale sign  -Hypotension and hyponatremia limit ability to initiate aldactone, loop diuretic, or BB at this time  -Outpatient GI follow up arranged  -MELD = 35     ENEDELIA on suspected CKDIII  Hepatorenal syndrome  -Baseline creatinine unknown  -Continue midodrine for BP support  -Nephro followed and outpatient nephrology follow up arranged     EtOH dependence  - No EtOH in 2 weeks (went through withdrawals at home prior to admission).  He has no interest  in starting back.  Has been seen by addiction team.     Possible renal lesion   -Previous provider d/w patient.  Will defer w/u for now and can f/u with PCP if he wishes.  His MELD score and advanced cirrhosis are primary concerns.           Key Discharge Recommendations:  GI Clinic will set up serial outpt paracentesis to help mitigate ascites since patients decompensation/hypotension/hepatorenal syndrome prohibit pharmacologic compensation.    Discharge Evaluation     Vital Signs:   Temp:  [97.7 °F (36.5 °C)-99.7 °F (37.6 °C)] 98.7 °F (37.1 °C)  Heart Rate:  [] 90  Resp:  [14-18] 16  BP: (111-143)/(72-85) 143/85     Physical Exam:  Constitutional: No acute distress, awake, alert, nontoxic, normal body habitus  Eyes: (+) scleral icteric  Respiratory: Clear to auscultation bilaterally, good effort, nonlabored respirations   Cardiovascular: RRR, no murmur  Gastrointestinal: ascites, less distended   Musculoskeletal: No peripheral edema, normal muscle tone for age  Psychiatric: Appropriate affect, good insight and judgement, cooperative  Neurologic: Oriented x 3    Pertinent and/or Most Recent Results      Results from last 7 days   Lab Units 02/29/20  0550 02/28/20  0653 02/27/20  0645 02/26/20  0537 02/25/20  0455 02/25/20  0454 02/24/20  0513   WBC 10*3/mm3  --   --   --  18.27* 19.62*  --  20.08*   HEMOGLOBIN g/dL  --   --   --  8.2* 8.9*  --  8.3*   HEMATOCRIT %  --   --   --  23.4* 25.2*  --  23.5*   PLATELETS 10*3/mm3  --   --   --  158 173  --  198   SODIUM mmol/L 129* 128* 129* 132*  --  133* 130*   POTASSIUM mmol/L 3.9 4.1 3.9 3.6  --  3.7 3.7   CHLORIDE mmol/L 92* 90* 88* 92*  --  92* 90*   CO2 mmol/L 23.0 26.0 28.0 27.0  --  27.0 27.0   BUN mg/dL 77* 73* 59* 60*  --  68* 74*   CREATININE mg/dL 1.83* 2.01* 1.84* 1.57*  --  1.89* 2.26*   GLUCOSE mg/dL 127* 130* 127* 162*  --  123* 147*   CALCIUM mg/dL 8.1* 8.2* 8.4* 8.3*  --  8.6 8.1*     Results from last 7 days   Lab Units 02/27/20  0645  02/26/20  0537 02/25/20  0454 02/24/20  0513   BILIRUBIN mg/dL 10.5* 10.1* 11.1* 10.3*   ALK PHOS U/L 236* 183* 163* 153*   ALT (SGPT) U/L 51* 45* 50* 52*   AST (SGOT) U/L 111* 92* 102* 91*   PROTIME Seconds 23.4* 24.4* 22.6* 23.2*   INR  2.12* 2.23* 2.10* 2.17*           Invalid input(s): TG, LDLCALC, LDLREALC  Results from last 7 days   Lab Units 02/23/20  1644   LACTATE mmol/L 2.6*       Brief Urine Lab Results  (Last result in the past 365 days)      Color   Clarity   Blood   Leuk Est   Nitrite   Protein   CREAT   Urine HCG        02/23/20 1425 Dark Yellow Clear Negative Trace Positive Negative               Microbiology Results Abnormal     Procedure Component Value - Date/Time    Body Fluid Culture - Body Fluid, Peritoneum [[id number]] Collected:  02/25/20 1441    Lab Status:  Preliminary result Specimen:  Body Fluid from Peritoneum Updated:  03/01/20 0744     Body Fluid Culture No growth at 4 days    Body Fluid Culture - Body Fluid, Peritoneum [[id number]] Collected:  02/23/20 1711    Lab Status:  Final result Specimen:  Body Fluid from Peritoneum Updated:  02/28/20 1800     Body Fluid Culture No growth at 5 days     Gram Stain Aerobic Bottle No organisms seen    Blood Culture - Blood, Arm, Left [[id number]] Collected:  02/23/20 1445    Lab Status:  Final result Specimen:  Blood from Arm, Left Updated:  02/28/20 1530     Blood Culture No growth at 5 days    Blood Culture - Blood, Arm, Right [[id number]] Collected:  02/23/20 1440    Lab Status:  Final result Specimen:  Blood from Arm, Right Updated:  02/28/20 1515     Blood Culture No growth at 5 days    Urine Culture - Urine, Urine, Clean Catch [[id number]]  (Normal) Collected:  02/23/20 1425    Lab Status:  Final result Specimen:  Urine, Clean Catch Updated:  02/24/20 1749     Urine Culture No growth          Imaging Results (All)     Procedure Component Value Units Date/Time    CT Abdomen Pelvis Without Contrast [[id number]] Collected:  02/23/20 1342     Updated:   02/24/20 2222    Narrative:       EXAMINATION: CT ABDOMEN/PELVIS WO CONTRAST - 02/23/2020     INDICATION: Right flank pain. Abdominal pain. Liver failure, ascites.     TECHNIQUE: 5 mm unenhanced images through the abdomen and pelvis.     The radiation dose reduction device was turned on for each scan per the  ALARA (As Low as Reasonably Achievable) protocol.     COMPARISON: None.     FINDINGS: History indicates new diagnosis of liver failure and ascites.     There is a small right pleural effusion and partial right lower lobe  atelectasis. Left lung base appears clear.     There is extensive ascites throughout the abdomen and pelvis. Liver  morphology is typical of cirrhosis with a relatively small nodular and  irregular liver. Spleen does not appear to be enlarged. No significant  abnormalities are seen of the pancreas, adrenal glands, or left kidney  for a non-IV contrast-enhanced exam. There is a lobular rounded 3 cm  area of the right upper renal pole, perhaps a focus of normal variant  lobulation of the kidney but extending into the renal sinus fat.  Exophytic mass or hyperdense cyst might appear similar. Density is very  similar to kidney parenchyma elsewhere. Please refer to axial images #44  and #45 of series 2 and coronal images # of series 900. No  adenopathy is seen. There is no evidence of free air in the abdomen or  pelvis. Bowel loops are nondistended. Bladder is nondistended. Bony  structures appear grossly intact. There is a small salt-and-pepper  lesion of L3 and a similar lesion of L5, probably incidental vertebral  hemangiomas.       Impression:       1. Extensive ascites.   2. Small right pleural effusion and moderate right lower lobe  atelectasis.  3. Nodular liver morphology typical of cirrhosis.  4. 3 cm area of lobulation of the right upper renal pole, extending into  the upper pole renal sinus fat. This may represent a variant of normal.  Renal neoplasm or hyperdense cyst are included  in the differential. At  some point, follow-up imaging should be considered, perhaps initially  with renal ultrasound.     DICTATED:   02/23/2020  EDITED/ls :   02/23/2020      This report was finalized on 2/24/2020 10:19 PM by Dr. Randy Paredes MD.       US Renal Limited [[id number]] Collected:  02/24/20 1501     Updated:  02/24/20 1706    Narrative:       EXAMINATION: US RENAL, LIMITED-02/24/2020:     INDICATION: ENEDELIA.      TECHNIQUE: Ultrasound of the kidneys and urinary bladder.     COMPARISON: NONE.     FINDINGS: The right kidney measures 12.3 cm in length without  hydronephrosis or obvious calculi containing an anechoic 2.6 cm simple  renal cortical cyst.     The left kidney measures 13.4 cm in length without hydronephrosis,  contour deforming mass or obvious calculi.     The urinary bladder is moderately-to-severely distended and grossly  unremarkable.     Ascites.       Impression:       1. No acute sonographic findings within the visualized kidneys  specifically, no hydronephrosis.  2. Urinary bladder is moderately-to-severely distended otherwise grossly  unremarkable.  3. Ascites.     D:  02/24/2020  E:  02/24/2020     This report was finalized on 2/24/2020 5:03 PM by Dr. Pato Toney.       XR Chest 1 View [[id number]] Collected:  02/23/20 1557     Updated:  02/23/20 2212    Narrative:       EXAMINATION: XR CHEST 1 VW - 02/23/2020     INDICATION: Shortness of air.     COMPARISON: None.     FINDINGS: Heart is enlarged. Vasculature appears a little cephalized.  There is a suggestion of a moderate hiatal hernia and very mild  bibasilar discoid atelectasis. No pneumothorax is seen.       Impression:       1. Cardiomegaly and mild pulmonary vascular congestion.  2. Mild bibasilar discoid atelectasis and what appears to be a moderate  hiatal hernia.      DICTATED:   02/23/2020  EDITED/ls :   02/23/2020      This report was finalized on 2/23/2020 10:08 PM by Dr. Randy Paredes MD.                 Order Current Status     Anaerobic Culture - Body Fluid, Peritoneum In process    Body Fluid Culture - Body Fluid, Peritoneum In process    Hemochromatosis Mutation In process    Body Fluid Culture - Body Fluid, Peritoneum Preliminary result        Discharge Medications and Follow-Up        Discharge Medications      New Medications      Instructions Start Date   lactulose 10 GM/15ML solution  Commonly known as:  CHRONULAC   20 g, Oral, 3 Times Daily PRN, Titrate for 2 bowel movements a day      midodrine 5 MG tablet  Commonly known as:  PROAMATINE   5 mg, Oral, 3 Times Daily             Future Appointments   Date Time Provider Department Center   3/6/2020  1:00 PM Amelia Miner APRN [facility] None   3/26/2020  7:30 AM Raymond Joyce APRN [id number] None       Additional Instructions for the Follow-ups that You Need to Schedule     Ambulatory Referral to Physical Therapy Evaluate and treat (TherEx/HEP for strengthening, core, and balance); Full weight bearing   As directed      Specialty needed:  Evaluate and treat (TherEx/HEP for strengthening, core, and balance)    Weight Bearing Status:  Full weight bearing               Time Spent on Discharge:  40 minutes    Electronically signed by Enma Zapata MD, 03/01/20, 12:50 PM.

## 2020-03-01 NOTE — OP NOTE
Bedside paracentesis, therapeutic.    The patient's right lateral abdomen was prepped in aseptic fashion.  2 mL of 1% lidocaine were instilled at the skin and peritoneal surface for local anesthesia.  A thoracentesis catheter was inserted into the peritoneal cavity, with recovery of pale yellow slightly cloudy ascites fluid.  Fluid was sent to the laboratory for cell count/differential, and culture.  See nursing I/O's for volume.  During the procedure the patient received intravenous albumin.  The patient tolerated the procedure well.  There were no immediate complications and no blood loss.

## 2020-03-02 ENCOUNTER — READMISSION MANAGEMENT (OUTPATIENT)
Dept: CALL CENTER | Facility: HOSPITAL | Age: 55
End: 2020-03-02

## 2020-03-02 LAB
BACTERIA FLD CULT: NORMAL
HFE GENE MUT ANL BLD/T: NORMAL

## 2020-03-02 RX ORDER — LACTULOSE 10 G/15ML
20 SOLUTION ORAL 3 TIMES DAILY PRN
Qty: 473 ML | Refills: 1 | Status: SHIPPED | OUTPATIENT
Start: 2020-03-02 | End: 2020-05-12 | Stop reason: SDUPTHER

## 2020-03-02 RX ORDER — MIDODRINE HYDROCHLORIDE 5 MG/1
5 TABLET ORAL 3 TIMES DAILY
Qty: 90 TABLET | Refills: 1 | Status: SHIPPED | OUTPATIENT
Start: 2020-03-02

## 2020-03-02 NOTE — OUTREACH NOTE
Prep Survey      Responses   Facility patient discharged from?  [place]   Is patient eligible?  No   What are the reasons patient is not eligible?  Other [Alcoholic hepatitis, alcoholism, alcohol abuse]   Does the patient have one of the following disease processes/diagnoses(primary or secondary)?  Other   Prep survey completed?  Yes          Sangita Rodriguez RN

## 2020-03-03 LAB — BACTERIA FLD CULT: NORMAL

## 2020-03-04 LAB
BACTERIA FLD CULT: NORMAL
GRAM STN SPEC: NORMAL

## 2020-03-06 ENCOUNTER — OFFICE VISIT (OUTPATIENT)
Dept: INTERNAL MEDICINE | Facility: CLINIC | Age: 55
End: 2020-03-06

## 2020-03-06 VITALS
SYSTOLIC BLOOD PRESSURE: 110 MMHG | DIASTOLIC BLOOD PRESSURE: 66 MMHG | WEIGHT: 178 LBS | OXYGEN SATURATION: 99 % | HEIGHT: 68 IN | RESPIRATION RATE: 18 BRPM | TEMPERATURE: 97.3 F | BODY MASS INDEX: 26.98 KG/M2 | HEART RATE: 119 BPM

## 2020-03-06 DIAGNOSIS — E43 SEVERE MALNUTRITION (HCC): Primary | ICD-10-CM

## 2020-03-06 DIAGNOSIS — N28.9 RENAL LESION: ICD-10-CM

## 2020-03-06 DIAGNOSIS — E87.1 HYPONATREMIA: ICD-10-CM

## 2020-03-06 DIAGNOSIS — D53.9 MACROCYTIC ANEMIA: ICD-10-CM

## 2020-03-06 DIAGNOSIS — R12 HEARTBURN: ICD-10-CM

## 2020-03-06 DIAGNOSIS — K70.11 ALCOHOLIC HEPATITIS WITH ASCITES: ICD-10-CM

## 2020-03-06 DIAGNOSIS — K70.31 ALCOHOLIC CIRRHOSIS OF LIVER WITH ASCITES (HCC): ICD-10-CM

## 2020-03-06 LAB
ALBUMIN SERPL-MCNC: 3.5 G/DL (ref 3.5–5.2)
ALBUMIN/GLOB SERPL: 1 G/DL
ALP SERPL-CCNC: 251 U/L (ref 39–117)
ALT SERPL W P-5'-P-CCNC: 45 U/L (ref 1–41)
ANION GAP SERPL CALCULATED.3IONS-SCNC: 16.2 MMOL/L (ref 5–15)
AST SERPL-CCNC: 100 U/L (ref 1–40)
BACTERIA SPEC ANAEROBE CULT: NORMAL
BILIRUB SERPL-MCNC: 8.9 MG/DL (ref 0.2–1.2)
BUN BLD-MCNC: 69 MG/DL (ref 6–20)
BUN/CREAT SERPL: 33.7 (ref 7–25)
CALCIUM SPEC-SCNC: 9.4 MG/DL (ref 8.6–10.5)
CHLORIDE SERPL-SCNC: 89 MMOL/L (ref 98–107)
CO2 SERPL-SCNC: 21.8 MMOL/L (ref 22–29)
CREAT BLD-MCNC: 2.05 MG/DL (ref 0.76–1.27)
GFR SERPL CREATININE-BSD FRML MDRD: 34 ML/MIN/1.73
GLOBULIN UR ELPH-MCNC: 3.6 GM/DL
GLUCOSE BLD-MCNC: 177 MG/DL (ref 65–99)
INR PPP: 1.8 (ref 0.85–1.16)
POTASSIUM BLD-SCNC: 5.3 MMOL/L (ref 3.5–5.2)
PROT SERPL-MCNC: 7.1 G/DL (ref 6–8.5)
PROTHROMBIN TIME: 20.5 SECONDS (ref 11.5–14)
SODIUM BLD-SCNC: 127 MMOL/L (ref 136–145)

## 2020-03-06 PROCEDURE — 85610 PROTHROMBIN TIME: CPT | Performed by: NURSE PRACTITIONER

## 2020-03-06 PROCEDURE — 99204 OFFICE O/P NEW MOD 45 MIN: CPT | Performed by: NURSE PRACTITIONER

## 2020-03-06 PROCEDURE — 85027 COMPLETE CBC AUTOMATED: CPT | Performed by: NURSE PRACTITIONER

## 2020-03-06 PROCEDURE — 80053 COMPREHEN METABOLIC PANEL: CPT | Performed by: NURSE PRACTITIONER

## 2020-03-06 RX ORDER — FAMOTIDINE 20 MG/1
20 TABLET, FILM COATED ORAL NIGHTLY PRN
Qty: 30 TABLET | Refills: 1 | Status: SHIPPED | OUTPATIENT
Start: 2020-03-06 | End: 2020-04-27

## 2020-03-06 NOTE — PROGRESS NOTES
Subjective   Unruly Liang is a 54 y.o. male here to establish care.  Chief Complaint   Patient presents with   • Establish Care     Hospital follow up   • Cirrhosis       History of Present Illness     Unruly is a 54-year-old male who was admitted to [facility] on 2/23/2020 with acute alcoholic hepatitis with ascites, severe malnutrition, possible renal lesion, macrocytic anemia, coagulopathy, leukocytosis, and Sirs.  He was discharged on 3/1/2020.    Patient reports that he was drinking heavily while in [place] for several weeks.  He noticed a jaundice color to his skin so he stopped drinking and then went through what he describes as DTs on his own couch for about a week before finally presenting to the emergency department.    Upon admission he was noted to have elevated liver function tests he was jaundiced and had progressive ascites.  He had serial paracentesis with the last being on 3/1/2020.  His ascites were noted to quickly reaccumulate and he has plans for serial outpatient paracentesis with standing orders to the [facility] GI clinic.  He had an EGD on 2/27/2020 18 which showed grade 2 varices.  He was hypotensive and hyponatremic during his hospitalization which limited the ability to initiate any Aldactone loop diuretic or beta-blocker.  He has outpatient follow-up arranged with GI.  That appointment is next week.    He was also noted to have acute kidney injury on suspected CKD 3.  There was no baseline creatinine for comparison.  He was started on Midrin during his hospitalization for blood pressure support.  Is also been arranged with outpatient nephrology follow-up.    Patient was noted to have a renal lesion.  He does not wish to follow-up on this at this time.    Today patient reports that he feels much better.  He is tolerating p.o. intake.  He is having bowel movements daily since starting lactulose.  He is not checking his blood pressure at home but blood pressure looks good in  office at 110/66.  He has continued taking the Midrin.  He does endorse some ongoing heartburn.  He is aware that he is not to take Prilosec to his hepatic function.    He feels generally overall weak.  He has a referral to physical therapy at [place] but he has not seen them yet.  Reports he has not felt quite up to doing this just yet but plans on doing it next week.         Answers for HPI/ROS submitted by the patient on 3/3/2020   What is the primary reason for your visit?: Other  Please describe your symptoms.: Alcoholic with kidney and liver cirrossis  Have you had these symptoms before?: Yes  How long have you been having these symptoms?: Other              The following portions of the patient's history were reviewed and updated as appropriate: allergies, current medications, past family history, past medical history, past social history, past surgical history and problem list.    Review of Systems   Constitutional: Positive for fatigue. Negative for appetite change, chills, diaphoresis and unexpected weight change.   Eyes: Negative for visual disturbance.   Respiratory: Negative for cough, chest tightness, shortness of breath and wheezing.    Cardiovascular: Negative for chest pain, palpitations and leg swelling.   Gastrointestinal: Negative for abdominal distention, abdominal pain, anal bleeding, blood in stool, constipation, diarrhea, nausea and vomiting.        Patient experiencing heartburn/acid reflux     Endocrine: Negative for polydipsia, polyphagia and polyuria.   Genitourinary: Negative for difficulty urinating and dysuria.   Skin: Negative for color change and rash.        Jaundice    Neurological: Positive for dizziness. Negative for syncope, weakness, light-headedness, numbness and headaches.   Hematological: Does not bruise/bleed easily.   Psychiatric/Behavioral: Negative for sleep disturbance.         No Known Allergies  Past Medical History:   Diagnosis Date   • Anemia 01/10/2020    caused by  detox   • Anxiety 10/01/2019    caused by drinking   • Asthma 01/10/2019    breathing only   • Cirrhosis (CMS/HCC)    • Clotting disorder (CMS/HCC) 10/01/2019    shave cuts bleeding uncontrolible   • Depression 10/01/2019   • Erectile dysfunction 01/10/2020   • GERD (gastroesophageal reflux disease) 1995    use Prilosec   • Headache 02/09/2020    detox and since then   • History of medical problems 01/10/2020    dizziness and balance issues   • Liver disease 2015   • Low back pain 01/10/2020    started with fall out of beach chair not set up right by wif   • Renal insufficiency 02/25/2020   • Urinary tract infection    • Visual impairment 01/10/2020    started with detox     Past Surgical History:   Procedure Laterality Date   • ENDOSCOPY N/A 2/27/2020    Procedure: ESOPHAGOGASTRODUODENOSCOPY;  Surgeon: Charly Lazo MD;  Location: [facility] ENDOSCOPY;  Service: Gastroenterology;  Laterality: N/A;   • SINUS SURGERY  1997    broken nose     Family History   Problem Relation Age of Onset   • Alcohol abuse Brother         detox 2013   • Liver disease Brother    • Anxiety disorder Son         2013   • Anxiety disorder Son         2019   • Birth defects Son         2001 Club foot   • Birth defects Brother         born blind   • Cancer Father         pancreatic   • Heart disease Mother         heart attack fatal   • Heart attack Mother      Social History     Socioeconomic History   • Marital status:      Spouse name: Not on file   • Number of children: Not on file   • Years of education: Not on file   • Highest education level: Not on file   Social Needs   • Financial resource strain: Not hard at all   • Food insecurity:     Worry: Never true     Inability: Not on file   • Transportation needs:     Medical: No     Non-medical: No   Tobacco Use   • Smoking status: Never Smoker   • Smokeless tobacco: Never Used   Substance and Sexual Activity   • Alcohol use: Not Currently     Comment: not drank since 2/2020   •  "Drug use: Never   • Sexual activity: Not Currently     Partners: Female     Immunization History   Administered Date(s) Administered   • Hepatitis A 02/26/2020   • Hepatitis B Vaccine Adult IM 02/26/2020   • Influenza, Unspecified 10/01/2019   • flucelvax quad pfs =>4 YRS 10/02/2019       Current Outpatient Medications:   •  lactulose (CHRONULAC) 10 GM/15ML solution, Take 30 mL by mouth 3 (Three) Times a Day As Needed for constipation. Titrate for 2 bowel movements a day., Disp: 473 mL, Rfl: 1  •  midodrine (PROAMATINE) 5 MG tablet, Take 1 tablet by mouth 3 (Three) Times a Day., Disp: 90 tablet, Rfl: 1  •  famotidine (PEPCID) 20 MG tablet, Take 1 tablet by mouth At Night As Needed for Heartburn., Disp: 30 tablet, Rfl: 1    Objective   Blood pressure 110/66, pulse 119, temperature 97.3 °F (36.3 °C), resp. rate 18, height 172.1 cm (67.75\"), weight 80.7 kg (178 lb), SpO2 99 %.  Physical Exam   Constitutional: He is oriented to person, place, and time. He appears well-developed and well-nourished.   HENT:   Head: Normocephalic and atraumatic.   Eyes: Pupils are equal, round, and reactive to light. Conjunctivae are normal.   Neck: Normal range of motion.   Cardiovascular: Normal rate, regular rhythm and normal heart sounds.   Pulmonary/Chest: Effort normal and breath sounds normal. No respiratory distress.   Abdominal: Soft. Normal appearance and bowel sounds are normal. He exhibits distension and ascites. He exhibits no shifting dullness, no pulsatile liver, no abdominal bruit and no pulsatile midline mass. There is no tenderness.   Neurological: He is alert and oriented to person, place, and time.   Skin: Skin is warm and dry.   Jaundice and scleral icterus noted   Psychiatric: He has a normal mood and affect. His speech is normal and behavior is normal. Judgment and thought content normal.   Nursing note and vitals reviewed.      Assessment/Plan   Unruly was seen today for establish care and cirrhosis.    Diagnoses and " all orders for this visit:    Severe malnutrition (CMS/HCC)  -     CBC (No Diff)  -     Comprehensive Metabolic Panel  -     Protime-INR    Alcoholic cirrhosis of liver with ascites (CMS/HCC)  -     CBC (No Diff)  -     Comprehensive Metabolic Panel  -     Protime-INR    Alcoholic hepatitis with ascites  -     CBC (No Diff)  -     Comprehensive Metabolic Panel  -     Protime-INR    Possible Renal lesion on CT  -     CBC (No Diff)  -     Comprehensive Metabolic Panel  -     Protime-INR    Macrocytic anemia  -     CBC (No Diff)  -     Comprehensive Metabolic Panel  -     Protime-INR    Hyponatremia  -     CBC (No Diff)  -     Comprehensive Metabolic Panel  -     Protime-INR    Heartburn  -     famotidine (PEPCID) 20 MG tablet; Take 1 tablet by mouth At Night As Needed for Heartburn.         Can start on Pepcid as needed for symptomatic management of heartburn.  Patient advised on GERD precautions: Weight loss, avoid alcohol and caffeine, small frequent meals, loosefitting clothing, and avoid spicy, acidic, or trigger foods.  May elevate the head of bed 30 degrees at night.  We will check labs as above   Continue midodrine for blood pressure support.  Continue lactulose for hepatic impairment  He is to follow up with GI and nephrology as planned.  Return in about 4 weeks (around 4/3/2020) for chronic condition follow up.  Plan of care discussed with pt. They verbalized understanding and agreement.       * Please note that portions of this note were completed with a voice recognition program. Efforts were made to edit the dictation but occasionally words are erroneously transcribed.

## 2020-03-07 LAB
DEPRECATED RDW RBC AUTO: 48.2 FL (ref 37–54)
ERYTHROCYTE [DISTWIDTH] IN BLOOD BY AUTOMATED COUNT: 12.8 % (ref 12.3–15.4)
HCT VFR BLD AUTO: 26.1 % (ref 37.5–51)
HGB BLD-MCNC: 10 G/DL (ref 13–17.7)
MCH RBC QN AUTO: 39.8 PG (ref 26.6–33)
MCHC RBC AUTO-ENTMCNC: 38.3 G/DL (ref 31.5–35.7)
MCV RBC AUTO: 104 FL (ref 79–97)
PLATELET # BLD AUTO: 203 10*3/MM3 (ref 140–450)
PMV BLD AUTO: 10.3 FL (ref 6–12)
RBC # BLD AUTO: 2.51 10*6/MM3 (ref 4.14–5.8)
WBC NRBC COR # BLD: 20.52 10*3/MM3 (ref 3.4–10.8)

## 2020-03-12 ENCOUNTER — TRANSCRIBE ORDERS (OUTPATIENT)
Dept: ADMINISTRATIVE | Facility: HOSPITAL | Age: 55
End: 2020-03-12

## 2020-03-12 ENCOUNTER — TELEPHONE (OUTPATIENT)
Dept: INTERNAL MEDICINE | Facility: CLINIC | Age: 55
End: 2020-03-12

## 2020-03-12 ENCOUNTER — OFFICE VISIT (OUTPATIENT)
Dept: INTERNAL MEDICINE | Facility: CLINIC | Age: 55
End: 2020-03-12

## 2020-03-12 VITALS
RESPIRATION RATE: 18 BRPM | BODY MASS INDEX: 27.58 KG/M2 | SYSTOLIC BLOOD PRESSURE: 112 MMHG | OXYGEN SATURATION: 99 % | WEIGHT: 182 LBS | TEMPERATURE: 98.7 F | HEART RATE: 121 BPM | DIASTOLIC BLOOD PRESSURE: 68 MMHG | HEIGHT: 68 IN

## 2020-03-12 DIAGNOSIS — K70.31 ALCOHOLIC CIRRHOSIS OF LIVER WITH ASCITES (HCC): Primary | ICD-10-CM

## 2020-03-12 DIAGNOSIS — R00.0 TACHYCARDIA: ICD-10-CM

## 2020-03-12 PROCEDURE — 99214 OFFICE O/P EST MOD 30 MIN: CPT | Performed by: NURSE PRACTITIONER

## 2020-03-12 NOTE — TELEPHONE ENCOUNTER
----- Message from DENICE Au sent at 3/12/2020  8:23 AM EDT -----  Please let him know how that his labs do show an improving hemoglobin and hematocrit (blood counts).  Kidney function is still elevated but stable and he does have with low sodium and a slightly potassium.  Would he be okay if I referred him back to the nutritionist that she can help him nutrition to help with these abnormal labs?  Follow-up with GI nephrology as planned

## 2020-03-12 NOTE — PROGRESS NOTES
"CHIEF COMPLAINT:  Ascites    HPI     Alcoholic cirrhosis of liver with ascites  Recent hospitalization on 2/23/20 - 3/1/20 for alcoholic cirrhosis of the liver  Limiting fluids to 1200 per nephrology today   Saw nephrology today, see's them next on 4/23, no labs completed at that visit  Appt with GI on 3/26/20     Patient was sent to our office today by his Nephrologist for paracentesis.  Nephrologist wanting patient to be seen and establish care with a physician.     Prior Discharge summary reviewed.   Last OV note reviewed.     Review of Systems   Constitutional: Positive for fatigue. Negative for chills, diaphoresis, fever and unexpected weight loss.   Respiratory: Positive for cough and shortness of breath. Negative for chest tightness.    Cardiovascular: Negative for chest pain, palpitations and leg swelling.   Gastrointestinal: Negative for abdominal pain, constipation, diarrhea, nausea and vomiting.   Skin: Positive for color change. Negative for rash.   Neurological: Positive for dizziness, weakness, light-headedness and confusion. Negative for headache.   Psychiatric/Behavioral: Positive for agitation and decreased concentration. Negative for hallucinations.      The following portions of the patient's history were reviewed and updated as appropriate: allergies, current medications, past family history, past medical history, past social history, past surgical history and problem list.    Visit Vitals  /68   Pulse (!) 121   Temp 98.7 °F (37.1 °C) (Temporal)   Resp 18   Ht 172.1 cm (67.76\")   Wt 82.6 kg (182 lb)   SpO2 99%   BMI 27.87 kg/m²      Physical Exam   Constitutional: He is oriented to person, place, and time. He appears well-developed and well-nourished. He is cooperative.  Non-toxic appearance. He has a sickly appearance. No distress. He appears overweight.   HENT:   Head: Normocephalic.   Right Ear: Hearing normal.   Left Ear: Hearing normal.   Eyes: Pupils are equal, round, and reactive to " light. Scleral icterus is present.   Cardiovascular: Regular rhythm and normal heart sounds. Tachycardia present.   Pulmonary/Chest: Effort normal and breath sounds normal. No respiratory distress. He has no wheezes.   Abdominal: He exhibits distension and ascites.   Abdomen firm and rounded    Neurological: He is alert and oriented to person, place, and time.   Skin: Skin is warm, dry and intact. No rash noted. He is not diaphoretic.   Generalized juandice    Psychiatric: He has a normal mood and affect. His speech is normal and behavior is normal. Judgment and thought content normal.   Vitals reviewed.    ASSESSMENT/PLAN  Diagnoses and all orders for this visit:    1. Alcoholic cirrhosis of liver with ascites (CMS/HCC) (Primary)  -     ABDOMINAL PARACENTESIS; Future    2. Tachycardia    worsening.  Reached out to patients GI office and was unsuccessful in getting in contact with any individual for recommendations. Contacted radiology and was able to set up outpatient paracentesis. Spoke with IR and they will attempt to get patient scheduled for tomorrow.  Spoke with patient about waiting until tomorrow to do outpatient or going to the ER and patient would prefer to wait until tomorrow to do it outpatient. Instructed patient to go to ER for any worsening SOB or tachycardia. Pt VU and agreeable to plan. Tachycardiac likely 2/2 to ascites.    Return for Establish care with physician in office.     At least 13 out of 25 minutes face-to-face were spent counseling patient extensively on treatment plan, compliance with medications, when to seek emergency medical care and the importance of following up with care.     Discussed the nature of the medical condition(s) risks, complications, management, safe and proper use of medications. Encouraged medication compliance and the importance of keeping scheduled follow up appointments with me and any other providers.  Patient instructed to follow up with our office for results  on any labs/imaging ordered during this visit.  Patient verbalizes understanding and agrees to treatment plan.

## 2020-03-12 NOTE — TELEPHONE ENCOUNTER
[facility] called to say that someone called to say we had the wrong phone number.  I took the phone number out of chart and sent letter through [employer].

## 2020-03-13 ENCOUNTER — TELEPHONE (OUTPATIENT)
Dept: INTERNAL MEDICINE | Facility: CLINIC | Age: 55
End: 2020-03-13

## 2020-03-13 ENCOUNTER — HOSPITAL ENCOUNTER (OUTPATIENT)
Dept: CT IMAGING | Facility: HOSPITAL | Age: 55
Discharge: HOME | End: 2020-03-13
Admitting: RADIOLOGY

## 2020-03-13 VITALS
TEMPERATURE: 97.7 F | SYSTOLIC BLOOD PRESSURE: 130 MMHG | WEIGHT: 184.6 LBS | DIASTOLIC BLOOD PRESSURE: 94 MMHG | BODY MASS INDEX: 27.34 KG/M2 | HEIGHT: 69 IN | RESPIRATION RATE: 20 BRPM | OXYGEN SATURATION: 100 % | HEART RATE: 111 BPM

## 2020-03-13 DIAGNOSIS — K70.31 ALCOHOLIC CIRRHOSIS OF LIVER WITH ASCITES (HCC): ICD-10-CM

## 2020-03-13 PROCEDURE — C1729 CATH, DRAINAGE: HCPCS

## 2020-03-13 PROCEDURE — 75989 ABSCESS DRAINAGE UNDER X-RAY: CPT

## 2020-03-13 RX ORDER — LIDOCAINE HYDROCHLORIDE 10 MG/ML
20 INJECTION, SOLUTION EPIDURAL; INFILTRATION; INTRACAUDAL; PERINEURAL ONCE
Status: COMPLETED | OUTPATIENT
Start: 2020-03-13 | End: 2020-03-13

## 2020-03-13 RX ADMIN — LIDOCAINE HYDROCHLORIDE 20 ML: 10 INJECTION, SOLUTION EPIDURAL; INFILTRATION; INTRACAUDAL; PERINEURAL at 17:05

## 2020-03-13 NOTE — TELEPHONE ENCOUNTER
Navya, wife, says there was an order sent over to radiology for Pt.  Navya says they were told it was sent over wrong and radiology sent it back to office.  Navya is requesting a call back when order is sent back over.

## 2020-03-13 NOTE — TELEPHONE ENCOUNTER
Pt's wife called and stated that when the patient was seen 3/12/20 by Jeannie Hebert, that a referral would be made to [facility].  Pt's wife stated that Dr. Hebert told her, if they haven't heard anything by today to call her back and let her know.  Pt's wife stated that they have heard nothing.    Please advise    Pt callback: [phone]

## 2020-03-14 NOTE — POST-PROCEDURE NOTE
Interventional Radiology Operative Note    Date: 03/13/20     Time: 21:48     Pre-op Diagnosis: Decompensated cirrhosis - symptomatic ascites  Post-op Diagnosis: Same    Procedure: CT guided paracentesis    Surgeon: ZEYNEP Marie M.D.  Assistants: None    Sedation: None    Estimated Blood Loss (EBL): Trace     Urine Output (UOP): N/A (short procedure)    IVF: N/A (short procedure)    Findings: Large volume ascites    Specimens: 7.5 liters clear yellow fluid    Complications: No Immediate    Disposition: Recovery room. Stable.

## 2020-03-16 ENCOUNTER — TELEPHONE (OUTPATIENT)
Dept: INFUSION THERAPY | Facility: HOSPITAL | Age: 55
End: 2020-03-16

## 2020-03-16 ENCOUNTER — TELEPHONE (OUTPATIENT)
Dept: GASTROENTEROLOGY | Facility: CLINIC | Age: 55
End: 2020-03-16

## 2020-03-16 NOTE — TELEPHONE ENCOUNTER
Veronique, Please call this patient and get him in sooner to see me. I have never seen him and will not order procedures until I have established care with him.     ----- Message from DENICE Au sent at 3/13/2020  4:03 PM EDT -----  Regarding: serial paracentesis  Pt was admitted to [facility] and seen by GI while there. Was supposed to be set up with serial paracentesis, but evidently there has been some mis-communication related to this. Pt did received paracentesis at [facility] IR as a one time order from my office 3/13/2020 but will need to be seen by GI ASAP and have further paracentesis set up. Can you please assist wit this?   Thanks,   Amelia

## 2020-03-17 ENCOUNTER — PREP FOR SURGERY (OUTPATIENT)
Dept: OTHER | Facility: HOSPITAL | Age: 55
End: 2020-03-17

## 2020-03-17 ENCOUNTER — OFFICE VISIT (OUTPATIENT)
Dept: GASTROENTEROLOGY | Facility: CLINIC | Age: 55
End: 2020-03-17

## 2020-03-17 ENCOUNTER — LAB (OUTPATIENT)
Dept: LAB | Facility: HOSPITAL | Age: 55
End: 2020-03-17

## 2020-03-17 VITALS
WEIGHT: 177.6 LBS | BODY MASS INDEX: 26.31 KG/M2 | DIASTOLIC BLOOD PRESSURE: 84 MMHG | SYSTOLIC BLOOD PRESSURE: 140 MMHG | HEART RATE: 120 BPM | HEIGHT: 69 IN

## 2020-03-17 DIAGNOSIS — F10.10 ETOH ABUSE: ICD-10-CM

## 2020-03-17 DIAGNOSIS — K76.82 HEPATIC ENCEPHALOPATHY (HCC): ICD-10-CM

## 2020-03-17 DIAGNOSIS — K70.31 ALCOHOLIC CIRRHOSIS OF LIVER WITH ASCITES (HCC): ICD-10-CM

## 2020-03-17 DIAGNOSIS — K70.31 ALCOHOLIC CIRRHOSIS OF LIVER WITH ASCITES (HCC): Primary | ICD-10-CM

## 2020-03-17 DIAGNOSIS — K72.10 END STAGE LIVER DISEASE (HCC): Primary | ICD-10-CM

## 2020-03-17 DIAGNOSIS — K76.7 HEPATORENAL SYNDROME (HCC): ICD-10-CM

## 2020-03-17 LAB
ALBUMIN SERPL-MCNC: 3.3 G/DL (ref 3.5–5.2)
ALBUMIN/GLOB SERPL: 0.8 G/DL
ALP SERPL-CCNC: 286 U/L (ref 39–117)
ALPHA-FETOPROTEIN: 4.13 NG/ML (ref 0–8.3)
ALT SERPL W P-5'-P-CCNC: 41 U/L (ref 1–41)
ANION GAP SERPL CALCULATED.3IONS-SCNC: 17.2 MMOL/L (ref 5–15)
ANISOCYTOSIS BLD QL: ABNORMAL
AST SERPL-CCNC: 94 U/L (ref 1–40)
BILIRUB SERPL-MCNC: 7.2 MG/DL (ref 0.2–1.2)
BUN BLD-MCNC: 40 MG/DL (ref 6–20)
BUN/CREAT SERPL: 24.5 (ref 7–25)
BURR CELLS BLD QL SMEAR: ABNORMAL
CALCIUM SPEC-SCNC: 8.7 MG/DL (ref 8.6–10.5)
CHLORIDE SERPL-SCNC: 88 MMOL/L (ref 98–107)
CO2 SERPL-SCNC: 20.8 MMOL/L (ref 22–29)
CREAT BLD-MCNC: 1.63 MG/DL (ref 0.76–1.27)
DEPRECATED RDW RBC AUTO: 42.9 FL (ref 37–54)
EOSINOPHIL # BLD MANUAL: 0.19 10*3/MM3 (ref 0–0.4)
EOSINOPHIL NFR BLD MANUAL: 1 % (ref 0.3–6.2)
ERYTHROCYTE [DISTWIDTH] IN BLOOD BY AUTOMATED COUNT: 11.9 % (ref 12.3–15.4)
GFR SERPL CREATININE-BSD FRML MDRD: 44 ML/MIN/1.73
GLOBULIN UR ELPH-MCNC: 4.4 GM/DL
GLUCOSE BLD-MCNC: 107 MG/DL (ref 65–99)
HCT VFR BLD AUTO: 37 % (ref 37.5–51)
HGB BLD-MCNC: 11.6 G/DL (ref 13–17.7)
INR PPP: 1.84 (ref 0.85–1.16)
LYMPHOCYTES # BLD MANUAL: 2.26 10*3/MM3 (ref 0.7–3.1)
LYMPHOCYTES NFR BLD MANUAL: 11.1 % (ref 5–12)
LYMPHOCYTES NFR BLD MANUAL: 12.1 % (ref 19.6–45.3)
MACROCYTES BLD QL SMEAR: ABNORMAL
MCH RBC QN AUTO: 41.3 PG (ref 26.6–33)
MCHC RBC AUTO-ENTMCNC: 31.4 G/DL (ref 31.5–35.7)
MCV RBC AUTO: 131.7 FL (ref 79–97)
MONOCYTES # BLD AUTO: 2.07 10*3/MM3 (ref 0.1–0.9)
NEUTROPHILS # BLD AUTO: 14.13 10*3/MM3 (ref 1.7–7)
NEUTROPHILS NFR BLD MANUAL: 75.8 % (ref 42.7–76)
PLAT MORPH BLD: NORMAL
PLATELET # BLD AUTO: 187 10*3/MM3 (ref 140–450)
PMV BLD AUTO: 9.5 FL (ref 6–12)
POTASSIUM BLD-SCNC: 4.8 MMOL/L (ref 3.5–5.2)
PROT SERPL-MCNC: 7.7 G/DL (ref 6–8.5)
PROTHROMBIN TIME: 20.9 SECONDS (ref 11.5–14)
RBC # BLD AUTO: 2.81 10*6/MM3 (ref 4.14–5.8)
SODIUM BLD-SCNC: 126 MMOL/L (ref 136–145)
WBC MORPH BLD: NORMAL
WBC NRBC COR # BLD: 18.64 10*3/MM3 (ref 3.4–10.8)

## 2020-03-17 PROCEDURE — 80053 COMPREHEN METABOLIC PANEL: CPT | Performed by: NURSE PRACTITIONER

## 2020-03-17 PROCEDURE — 85007 BL SMEAR W/DIFF WBC COUNT: CPT | Performed by: NURSE PRACTITIONER

## 2020-03-17 PROCEDURE — 85610 PROTHROMBIN TIME: CPT

## 2020-03-17 PROCEDURE — 82105 ALPHA-FETOPROTEIN SERUM: CPT | Performed by: NURSE PRACTITIONER

## 2020-03-17 PROCEDURE — 36415 COLL VENOUS BLD VENIPUNCTURE: CPT | Performed by: NURSE PRACTITIONER

## 2020-03-17 PROCEDURE — 85025 COMPLETE CBC W/AUTO DIFF WBC: CPT | Performed by: NURSE PRACTITIONER

## 2020-03-17 PROCEDURE — 99214 OFFICE O/P EST MOD 30 MIN: CPT | Performed by: NURSE PRACTITIONER

## 2020-03-17 RX ORDER — PROPRANOLOL HYDROCHLORIDE 20 MG/1
20 TABLET ORAL 2 TIMES DAILY
Qty: 60 TABLET | Refills: 2 | Status: SHIPPED | OUTPATIENT
Start: 2020-03-17 | End: 2020-03-24

## 2020-03-17 RX ORDER — ALBUMIN (HUMAN) 12.5 G/50ML
12.5 SOLUTION INTRAVENOUS ONCE
Status: CANCELLED | OUTPATIENT
Start: 2020-03-17 | End: 2020-03-17

## 2020-03-17 NOTE — PROGRESS NOTES
GASTROENTEROLOGY OFFICE NOTE  Unruly Liang  [id number]  1965    CARE TEAM  Patient Care Team:  Cathy Ware MD as PCP - General (Internal Medicine)    Referring Provider: Cathy Ware MD     Chief Complaint   Patient presents with   • Cirrhosis     hosp f/u         HISTORY OF PRESENT ILLNESS:  Mr. Liang is a 54-year-old male who was admitted to [facility] from 2/23/2020-3/1/2020 with alcoholic cirrhosis with ascites, severe malnutrition, macrocytic anemia, coagulopathy, and hepatorenal syndrome.  Hospital course included serial paracentesis: 7L removed on 2/23, 8L removed on 2/25, and 7L removed on 3/1.  Ascites quickly reaccumulated and patient unable to tolerate diuresis due to renal function.  He continues to have problems with ascites but is not symptomatic at this time.  He denies fevers.    EGD on 2/27/2020 with grade 2 varices with stigmata of recent bleeding and red vic sign.  Patient was placed on Coreg but he was unable to tolerate beta-blocker due to hypotension and was ultimately treated for hypotension with Midodrin and discharged home taking this as well.  Today his blood pressure is 140/84 and heart rate is 120.  His wife has been taking his blood pressure and heart rate at home and reports that his blood pressures been staying in the 80s systolically.  He has had no signs of GI blood loss.    He is currently taking lactulose 20 g daily and is having 2-3 bowel movements daily.  He is experiencing sleep disorder, sleeping more often and a trivial lack of awareness as well as shortened attention span but no clear disorientation or confusion.  He remains very jaundiced.    CT abdomen and pelvis while hospitalized (2/23/2020) shows extensive ascites and a nodular liver morphology typical of cirrhosis, no mention of lesions.  There is a renal neoplasm or hyperdense cyst in the right upper renal pole.  He has followed up with nephrology since his discharge and does not have  another follow-up until April 23.    He has maintained sobriety since February 3, 2020.    PAST MEDICAL HISTORY  Past Medical History:   Diagnosis Date   • Anemia 01/10/2020    caused by detox   • Anxiety 10/01/2019    caused by drinking   • Asthma 01/10/2019    breathing only   • Cirrhosis (CMS/HCC)    • Clotting disorder (CMS/HCC) 10/01/2019    shave cuts bleeding uncontrolible   • Depression 10/01/2019   • Erectile dysfunction 01/10/2020   • GERD (gastroesophageal reflux disease) 1995    use Prilosec   • Headache 02/09/2020    detox and since then   • History of medical problems 01/10/2020    dizziness and balance issues   • Liver disease 2015   • Low back pain 01/10/2020    started with fall out of beach chair not set up right by wif   • Renal insufficiency 02/25/2020   • Urinary tract infection    • Visual impairment 01/10/2020    started with detox        PAST SURGICAL HISTORY  Past Surgical History:   Procedure Laterality Date   • ENDOSCOPY N/A 2/27/2020    Procedure: ESOPHAGOGASTRODUODENOSCOPY;  Surgeon: Charly Lazo MD;  Location: [facility] ENDOSCOPY;  Service: Gastroenterology;  Laterality: N/A;   • PARACENTESIS     • SINUS SURGERY  1997    broken nose        MEDICATIONS:    Current Outpatient Medications:   •  famotidine (PEPCID) 20 MG tablet, Take 1 tablet by mouth At Night As Needed for Heartburn., Disp: 30 tablet, Rfl: 1  •  lactulose (CHRONULAC) 10 GM/15ML solution, Take 30 mL by mouth 3 (Three) Times a Day As Needed for constipation. Titrate for 2 bowel movements a day., Disp: 473 mL, Rfl: 1  •  midodrine (PROAMATINE) 5 MG tablet, Take 1 tablet by mouth 3 (Three) Times a Day., Disp: 90 tablet, Rfl: 1  •  propranolol (INDERAL) 20 MG tablet, Take 1 tablet by mouth 2 (Two) Times a Day., Disp: 60 tablet, Rfl: 2  •  riFAXIMin (Xifaxan) 550 MG tablet, Take 1 tablet by mouth Every 12 (Twelve) Hours., Disp: 60 tablet, Rfl: 11    ALLERGIES  No Known Allergies    FAMILY HISTORY:  Family History   Problem  Relation Age of Onset   • Alcohol abuse Brother         detox 2013   • Liver disease Brother    • Anxiety disorder Son         2013   • Anxiety disorder Son         2019   • Birth defects Son         2001 Club foot   • Birth defects Brother         born blind   • Cancer Father         pancreatic   • Heart disease Mother         heart attack fatal   • Heart attack Mother        SOCIAL HISTORY  Social History     Socioeconomic History   • Marital status:      Spouse name: Not on file   • Number of children: Not on file   • Years of education: Not on file   • Highest education level: Not on file   Social Needs   • Financial resource strain: Not hard at all   • Food insecurity:     Worry: Never true     Inability: Not on file   • Transportation needs:     Medical: No     Non-medical: No   Tobacco Use   • Smoking status: Never Smoker   • Smokeless tobacco: Never Used   Substance and Sexual Activity   • Alcohol use: Not Currently     Comment: not drank since 2/2020   • Drug use: Never   • Sexual activity: Not Currently     Partners: Female       REVIEW OF SYSTEMS  Review of Systems   Constitutional: Positive for fatigue and unexpected weight gain. Negative for activity change, appetite change, chills, diaphoresis, fever and unexpected weight loss.   HENT: Negative for congestion, dental problem, drooling, ear discharge, ear pain, facial swelling, hearing loss, mouth sores, nosebleeds, postnasal drip, rhinorrhea, sinus pressure, sneezing, sore throat, swollen glands, tinnitus, trouble swallowing and voice change.    Respiratory: Negative for apnea, cough, choking, chest tightness, shortness of breath, wheezing and stridor.    Cardiovascular: Negative for chest pain, palpitations and leg swelling.   Gastrointestinal: Positive for abdominal distention. Negative for abdominal pain, anal bleeding, blood in stool, constipation, diarrhea, nausea, rectal pain, vomiting, GERD and indigestion.   Endocrine: Negative for cold  "intolerance, heat intolerance, polydipsia, polyphagia and polyuria.   Musculoskeletal: Negative for arthralgias, back pain, gait problem, joint swelling, myalgias, neck pain, neck stiffness and bursitis.   Skin: Positive for color change and dry skin. Negative for rash and skin lesions.   Allergic/Immunologic: Negative for environmental allergies, food allergies and immunocompromised state.   Neurological: Positive for memory problem. Negative for dizziness, tremors, seizures, syncope, facial asymmetry, speech difficulty, weakness, light-headedness, numbness, headache and confusion.   Hematological: Negative for adenopathy. Bruises/bleeds easily.   Psychiatric/Behavioral: Positive for decreased concentration and sleep disturbance. Negative for agitation, behavioral problems, dysphoric mood, hallucinations, self-injury, suicidal ideas, negative for hyperactivity, depressed mood and stress. The patient is not nervous/anxious.          PHYSICAL EXAM   /84 (BP Location: Right arm, Patient Position: Sitting, Cuff Size: Adult)   Pulse 120   Ht 175.3 cm (69.02\")   Wt 80.6 kg (177 lb 9.6 oz)   BMI 26.21 kg/m²   Physical Exam   Constitutional: He is oriented to person, place, and time. He appears well-developed and well-nourished. He appears ill.   HENT:   Head: Normocephalic.   Mouth/Throat: Oropharynx is clear and moist.   Eyes: Pupils are equal, round, and reactive to light. EOM are normal. Scleral icterus is present.   Neck: Normal range of motion. Neck supple.   Cardiovascular: Normal rate and regular rhythm.   Pulmonary/Chest: Effort normal and breath sounds normal. He has no wheezes. He has no rales.   Abdominal: Soft. Bowel sounds are normal. He exhibits ascites. He exhibits no mass. There is no tenderness. There is no rebound and no guarding. No hernia.   Musculoskeletal: Normal range of motion.   Neurological: He is alert and oriented to person, place, and time. No cranial nerve deficit.   Skin: Skin is " warm and dry.   jaundice   Psychiatric: He has a normal mood and affect. His behavior is normal. Judgment normal.   Nursing note and vitals reviewed.    Results Review:  [facility] records reviewed and discussed with patient.     ASSESSMENT / PLAN  1. End Stage Liver Disease  2. Cirrhosis, decompensated by ascites, variceal bleed, hepatic encephalopathy  -cirrhosis secondary to ETOH  -MELDNa 26 (3/6 Labs)  Plan:  -CBC, CMP, PT/INR, AFP today  -Referral to [facility]    3.  Hepatic encephalopathy  Grade I - [place] Criteria  Plan:  -Continue Lactulose Titrate to 2-3 soft BM/day  -Add Xifaxan 550 mg BID    4.  Ascites/Pedal edema  5. Hepatorenal syndrome  -Unable to tolerate diruetics  Plan:  -Low sodium diet 2 gm/day  -Paracentesis prn (next scheduled 3/20/2020)    6. Esophageal varices - bleeding  -Last EGD (2/27/2020): Three columns of nonbleeding grade II varices were found in the lower third of the esophagus, 30 to 38 cm from the incisors.  They were 4 mm in largest diameter.  Stigmata of recent bleeding were evident and red vic signs were present.  Plan:  -Will begin Secondary prophylaxis: Propranolol 20 mg BID.  Target HR 55-65 bpm, systolic BP > 90 mmHg   -Monitor BP and heart rate closely and call or utilize MyChart with readings.  -Heart Rate: 120  -BP: 140/84    # Hepatocellular carcinoma surveillance  - Abdominal imaging every six months  - Last abdominal CT (2/23/2020). Result: extensive ascites and a nodular liver morphology typical of cirrhosis, no mention of lesions  -Next imaging due August 2020    # Nutrition  -High protein diet  -Low sodium diet 2 gm/day  -Avoid alcohol, avoid NSAIDS    # Health maintenance  -Immunization against HAV #1 of 2 given 2/26/2020  -Immunization against HBV #1 of 3 given 2/26/2020    7.  EtOH abuse  Plan:  -Complete abstinence from all alcohol    Return in about 2 weeks (around 3/31/2020).    I discussed the patients findings and my recommendations with patient and  family    Raymond Joyce, APRN

## 2020-03-20 ENCOUNTER — HOSPITAL ENCOUNTER (OUTPATIENT)
Dept: CT IMAGING | Facility: HOSPITAL | Age: 55
Discharge: HOME | End: 2020-03-20
Admitting: NURSE PRACTITIONER

## 2020-03-20 ENCOUNTER — TELEPHONE (OUTPATIENT)
Dept: GASTROENTEROLOGY | Facility: CLINIC | Age: 55
End: 2020-03-20

## 2020-03-20 VITALS
BODY MASS INDEX: 26.81 KG/M2 | OXYGEN SATURATION: 99 % | TEMPERATURE: 97.8 F | RESPIRATION RATE: 18 BRPM | WEIGHT: 181 LBS | SYSTOLIC BLOOD PRESSURE: 101 MMHG | HEIGHT: 69 IN | HEART RATE: 122 BPM | DIASTOLIC BLOOD PRESSURE: 69 MMHG

## 2020-03-20 DIAGNOSIS — K70.31 ALCOHOLIC CIRRHOSIS OF LIVER WITH ASCITES (HCC): ICD-10-CM

## 2020-03-20 LAB
ALBUMIN FLD-MCNC: <0.4 G/DL
APPEARANCE FLD: ABNORMAL
COLOR FLD: YELLOW
LYMPHOCYTES NFR FLD MANUAL: 28 %
MACROPHAGE FLUID: 39 %
MESOTHL CELL NFR FLD MANUAL: 11 %
MONOCYTES NFR FLD: 17 %
NEUTROPHILS NFR FLD MANUAL: 5 %
PROT FLD-MCNC: <1 G/DL
RBC # FLD AUTO: <2000 /MM3
WBC # FLD AUTO: 181 /MM3

## 2020-03-20 PROCEDURE — P9047 ALBUMIN (HUMAN), 25%, 50ML: HCPCS | Performed by: NURSE PRACTITIONER

## 2020-03-20 PROCEDURE — 87205 SMEAR GRAM STAIN: CPT | Performed by: NURSE PRACTITIONER

## 2020-03-20 PROCEDURE — 82042 OTHER SOURCE ALBUMIN QUAN EA: CPT | Performed by: NURSE PRACTITIONER

## 2020-03-20 PROCEDURE — 88112 CYTOPATH CELL ENHANCE TECH: CPT | Performed by: NURSE PRACTITIONER

## 2020-03-20 PROCEDURE — 75989 ABSCESS DRAINAGE UNDER X-RAY: CPT

## 2020-03-20 PROCEDURE — 87015 SPECIMEN INFECT AGNT CONCNTJ: CPT | Performed by: NURSE PRACTITIONER

## 2020-03-20 PROCEDURE — 25010000002 ALBUMIN HUMAN 25% PER 50 ML: Performed by: NURSE PRACTITIONER

## 2020-03-20 PROCEDURE — C1729 CATH, DRAINAGE: HCPCS

## 2020-03-20 PROCEDURE — 89051 BODY FLUID CELL COUNT: CPT | Performed by: NURSE PRACTITIONER

## 2020-03-20 PROCEDURE — 88305 TISSUE EXAM BY PATHOLOGIST: CPT | Performed by: NURSE PRACTITIONER

## 2020-03-20 PROCEDURE — 25010000002 ALBUMIN HUMAN 25% PER 50 ML: Performed by: RADIOLOGY

## 2020-03-20 PROCEDURE — P9047 ALBUMIN (HUMAN), 25%, 50ML: HCPCS | Performed by: RADIOLOGY

## 2020-03-20 PROCEDURE — 84157 ASSAY OF PROTEIN OTHER: CPT | Performed by: NURSE PRACTITIONER

## 2020-03-20 PROCEDURE — 87070 CULTURE OTHR SPECIMN AEROBIC: CPT | Performed by: NURSE PRACTITIONER

## 2020-03-20 PROCEDURE — 25010000003 LIDOCAINE 1 % SOLUTION: Performed by: RADIOLOGY

## 2020-03-20 RX ORDER — ALBUMIN (HUMAN) 12.5 G/50ML
12.5 SOLUTION INTRAVENOUS ONCE
Status: COMPLETED | OUTPATIENT
Start: 2020-03-20 | End: 2020-03-20

## 2020-03-20 RX ORDER — LIDOCAINE HYDROCHLORIDE 10 MG/ML
20 INJECTION, SOLUTION INFILTRATION; PERINEURAL ONCE
Status: COMPLETED | OUTPATIENT
Start: 2020-03-20 | End: 2020-03-20

## 2020-03-20 RX ADMIN — LIDOCAINE HYDROCHLORIDE 20 ML: 10 INJECTION, SOLUTION INFILTRATION; PERINEURAL at 11:30

## 2020-03-20 RX ADMIN — ALBUMIN HUMAN 12.5 G: 0.25 SOLUTION INTRAVENOUS at 12:15

## 2020-03-20 RX ADMIN — ALBUMIN HUMAN 12.5 G: 0.25 SOLUTION INTRAVENOUS at 12:17

## 2020-03-20 NOTE — NURSING NOTE
Paracentesis performed by Dr Marie.  6.7 liters yellow fluid removed.  Pt tolerated well.  Report called to Soni in [facility].

## 2020-03-20 NOTE — POST-PROCEDURE NOTE
Interventional Radiology Operative Note    Date: 03/20/20     Time: 17:41     Pre-op Diagnosis: Decompensated cirrhosis. Recurrent large volume symptomatic ascites  Post-op Diagnosis: Same    Procedure: CT guided paracentesis    Surgeon: ZEYNEP Marie M.D.  Assistants: None    Sedation: None    Estimated Blood Loss (EBL): Trace     Urine Output (UOP): N/A (short procedure)    IVF: N/A (short procedure)    Findings: Large volume ascites    Specimens: 7.6 liters cloudy yellow fluid. Portion sent for requested laboratory analysis    Complications: No immediate    Disposition: Recovery. Stable

## 2020-03-20 NOTE — TELEPHONE ENCOUNTER
----- Message from Unruly Liang sent at 3/18/2020  3:06 PM EDT -----  Regarding: Visit Follow-Up Question  Contact: [phone]  Ravindra Andrade!  This is Navya, Unruly Liang's wife, checking in to let you know Unruly has not taken any blood pressure medication since we saw you yesterday. Here's Unruly's BP to date;  3/18, 3:00 pm- 99/62, 117 pulse  3/18, 10:00 am- 122/72, 114 pulse  3/17, 10:30 pm- 116/77, 120 pulse  3/17, 2:00 pm, 124/84, 115 pulse    He's feeling not as light headed, but sleeping/ resting a lot.

## 2020-03-23 ENCOUNTER — TELEPHONE (OUTPATIENT)
Dept: INFUSION THERAPY | Facility: HOSPITAL | Age: 55
End: 2020-03-23

## 2020-03-23 LAB
BACTERIA FLD CULT: NORMAL
GRAM STN SPEC: NORMAL
GRAM STN SPEC: NORMAL
LAB AP CASE REPORT: NORMAL
PATH REPORT.FINAL DX SPEC: NORMAL

## 2020-03-23 NOTE — TELEPHONE ENCOUNTER
Patient was notified to discontinue propranolol and start nadolol 20 mg daily. Continue to monitor BP and HR.      Patient has been taking propanolol since sat cuurent reading at time of call was BP 96/64 @2:30pm but overnight is when it seems to spike high. Patient has also been experiencing some dizziness and falling down due to it. Advised patient that he should take a pressure before each dose also that if dizziness did not subside to seek ER for further workup. Patient wants to try and increase dose to 3 pills a day if possible what would you recommend?

## 2020-03-23 NOTE — TELEPHONE ENCOUNTER
I would like him to begin the Propranolol that I prescribed. It is to be taken 2 times daily. Take BP prior to each dose. If systolic BP (top number) is 90 or below hold dose. My goal is to get his pulse down between 55-65 with this medication.

## 2020-03-24 RX ORDER — NADOLOL 20 MG/1
20 TABLET ORAL DAILY
Qty: 30 TABLET | Refills: 5 | Status: SHIPPED | OUTPATIENT
Start: 2020-03-24 | End: 2020-04-16 | Stop reason: HOSPADM

## 2020-03-26 ENCOUNTER — TELEPHONE (OUTPATIENT)
Dept: GASTROENTEROLOGY | Facility: CLINIC | Age: 55
End: 2020-03-26

## 2020-03-26 ENCOUNTER — PREP FOR SURGERY (OUTPATIENT)
Dept: OTHER | Facility: HOSPITAL | Age: 55
End: 2020-03-26

## 2020-03-26 DIAGNOSIS — K70.31 ALCOHOLIC CIRRHOSIS OF LIVER WITH ASCITES (HCC): Primary | ICD-10-CM

## 2020-03-26 RX ORDER — ALBUMIN (HUMAN) 12.5 G/50ML
12.5 SOLUTION INTRAVENOUS ONCE
Status: CANCELLED | OUTPATIENT
Start: 2020-03-26 | End: 2020-03-26

## 2020-03-29 ENCOUNTER — PATIENT MESSAGE (OUTPATIENT)
Dept: GASTROENTEROLOGY | Facility: CLINIC | Age: 55
End: 2020-03-29

## 2020-03-30 NOTE — TELEPHONE ENCOUNTER
From: Unruly Liang  To: Raymond Joyce, DENICE  Sent: 3/29/2020 10:16 PM EDT  Subject: Visit Follow-Up Question    Ravindra Andrade-  Update with Unruly Liang on the new Nadolol dose:  He feels awful, disoriented, tired, skin crawls, and doesnt sleep. Has been walking using a cane. BM's are at least 3 x day.  Here's his BP readings:  3/29, 930 pm- 99/50, pulse 114   3/29, 400 pm- 111/68, pulse 150  3/29, 200 pm, 109/70, pulse 108  3/29, 1130 am- 108/65, pulse 95  3/29, 900 am-87/45, pulse 98 (refused to take Nadolol)  3/28, 500 pm- 109/77, pulse 100  3/28, 300 pm- 107/66, pulse 98  3/28, 1200 pm- 120/88, pulse 99 (took Nadolol at 900 am)  3/27, 800 pm, 100/60, pulse 89  3/27, 130 pm- 118/83, pulse 96  3/27, 800 am- 99/59, pulse 91 (took Nadolol at 800 am)    He would like to stop taking the Nadolol for a few days. Your thoughts? Also need to have a parcentisis this week.

## 2020-04-03 ENCOUNTER — HOSPITAL ENCOUNTER (OUTPATIENT)
Dept: CT IMAGING | Facility: HOSPITAL | Age: 55
Discharge: HOME | End: 2020-04-03
Admitting: RADIOLOGY

## 2020-04-03 ENCOUNTER — RESULTS ENCOUNTER (OUTPATIENT)
Dept: OTHER | Facility: HOSPITAL | Age: 55
End: 2020-04-03

## 2020-04-03 VITALS
HEIGHT: 69 IN | HEART RATE: 75 BPM | TEMPERATURE: 96.5 F | RESPIRATION RATE: 20 BRPM | OXYGEN SATURATION: 97 % | SYSTOLIC BLOOD PRESSURE: 102 MMHG | WEIGHT: 190.8 LBS | DIASTOLIC BLOOD PRESSURE: 67 MMHG | BODY MASS INDEX: 28.26 KG/M2

## 2020-04-03 DIAGNOSIS — K70.31 ALCOHOLIC CIRRHOSIS OF LIVER WITH ASCITES (HCC): ICD-10-CM

## 2020-04-03 PROCEDURE — 25010000002 ALBUMIN HUMAN 25% PER 50 ML: Performed by: NURSE PRACTITIONER

## 2020-04-03 PROCEDURE — 75989 ABSCESS DRAINAGE UNDER X-RAY: CPT

## 2020-04-03 PROCEDURE — P9047 ALBUMIN (HUMAN), 25%, 50ML: HCPCS | Performed by: NURSE PRACTITIONER

## 2020-04-03 PROCEDURE — C1729 CATH, DRAINAGE: HCPCS

## 2020-04-03 RX ORDER — SODIUM CHLORIDE 0.9 % (FLUSH) 0.9 %
3 SYRINGE (ML) INJECTION EVERY 12 HOURS SCHEDULED
Status: DISCONTINUED | OUTPATIENT
Start: 2020-04-03 | End: 2020-04-04 | Stop reason: HOSPADM

## 2020-04-03 RX ORDER — SODIUM CHLORIDE 0.9 % (FLUSH) 0.9 %
10 SYRINGE (ML) INJECTION AS NEEDED
Status: DISCONTINUED | OUTPATIENT
Start: 2020-04-03 | End: 2020-04-04 | Stop reason: HOSPADM

## 2020-04-03 RX ORDER — ALBUMIN (HUMAN) 12.5 G/50ML
12.5 SOLUTION INTRAVENOUS ONCE
Status: COMPLETED | OUTPATIENT
Start: 2020-04-03 | End: 2020-04-03

## 2020-04-03 RX ORDER — LIDOCAINE HYDROCHLORIDE 10 MG/ML
20 INJECTION, SOLUTION EPIDURAL; INFILTRATION; INTRACAUDAL; PERINEURAL ONCE
Status: COMPLETED | OUTPATIENT
Start: 2020-04-03 | End: 2020-04-03

## 2020-04-03 RX ADMIN — ALBUMIN HUMAN 12.5 G: 0.25 SOLUTION INTRAVENOUS at 11:45

## 2020-04-03 RX ADMIN — LIDOCAINE HYDROCHLORIDE 20 ML: 10 INJECTION, SOLUTION EPIDURAL; INFILTRATION; INTRACAUDAL; PERINEURAL at 11:06

## 2020-04-03 NOTE — NURSING NOTE
Pt discharged to home s/p paracentesis.  Pt tolerated procedure without complications.  Discharge instructions reviewed with patient who verbalizes understanding.  Pt transported to exit via wheelchair to meet with his wife who was waiting in the car at front entrance.

## 2020-04-03 NOTE — NURSING NOTE
Paracentesis performed by Dr Marie.  9.1 liters yellow fluid removed.  Pt tolerated well.  Report called to [facility].

## 2020-04-03 NOTE — POST-PROCEDURE NOTE
Interventional Radiology Operative Note    Date: 04/03/20     Time: 14:19      Pre-op Diagnosis: Decompensated cirrhosis - symptomatic ascites  Post-op Diagnosis: Same     Procedure: CT guided paracentesis     Surgeon: ZEYNEP Marie M.D.  Assistants: None     Sedation: None     Estimated Blood Loss (EBL): Trace      Urine Output (UOP): N/A (short procedure)     IVF: N/A (short procedure)     Findings: Large volume ascites     Specimens: 8.5 liters clear serous fluid     Complications: No Immediate     Disposition: Recovery room. Stable.

## 2020-04-06 ENCOUNTER — TELEPHONE (OUTPATIENT)
Dept: INFUSION THERAPY | Facility: HOSPITAL | Age: 55
End: 2020-04-06

## 2020-04-07 ENCOUNTER — TELEMEDICINE (OUTPATIENT)
Dept: GASTROENTEROLOGY | Facility: CLINIC | Age: 55
End: 2020-04-07

## 2020-04-07 VITALS — HEART RATE: 85 BPM | DIASTOLIC BLOOD PRESSURE: 85 MMHG | SYSTOLIC BLOOD PRESSURE: 119 MMHG

## 2020-04-07 DIAGNOSIS — K76.82 HEPATIC ENCEPHALOPATHY (HCC): ICD-10-CM

## 2020-04-07 DIAGNOSIS — F10.11 HISTORY OF ETOH ABUSE: ICD-10-CM

## 2020-04-07 DIAGNOSIS — I85.11 SECONDARY ESOPHAGEAL VARICES WITH BLEEDING (HCC): ICD-10-CM

## 2020-04-07 DIAGNOSIS — K70.31 ALCOHOLIC CIRRHOSIS OF LIVER WITH ASCITES (HCC): ICD-10-CM

## 2020-04-07 DIAGNOSIS — K72.10 END STAGE LIVER DISEASE (HCC): Primary | ICD-10-CM

## 2020-04-07 PROCEDURE — 99214 OFFICE O/P EST MOD 30 MIN: CPT | Performed by: NURSE PRACTITIONER

## 2020-04-07 NOTE — H&P (VIEW-ONLY)
GASTROENTEROLOGY OFFICE NOTE  Unruly Liang  [id number]  1965    CARE TEAM  Patient Care Team:  Cathy Ware MD as PCP - General (Internal Medicine)    Referring Provider: Cathy Ware MD    Chief Complaint   Patient presents with   • Cirrhosis        HISTORY OF PRESENT ILLNESS:  Mr. Liang is seen today via video visit with EtOH cirrhosis decompensated by hepatic encephalopathy, ascites, and esophageal variceal bleeding with hepatorenal syndrome, unable to tolerate diuretic therapy.  He continues to have paracentesis as needed, about every 2 weeks.  Last paracentesis was on 4/3/2020, 8.5 L was drained.  No history of SBP.  He denies fevers.  He reports he is now having very good urine flow.    EGD on 2/27/2020 with grade 2 varices with stigmata of recent bleeding and red vic sign.  He is currently taking nadolol 20 mg daily.  He is not tolerating this very well.  It causes dizziness and extreme fatigue.  He has thus switched this to taking in the evening rather than in the morning.  He reports he takes it about 7 PM every night and by the time he goes to bed around 11 he can barely make it up the steps his wife has to help him due to extreme dizziness.  He is monitoring his blood pressure and heart rate at home.  Systolic blood pressure has been ranging from , his heart rate has been ranging from 83-97 over the past 3 days.  Nadolol however has controlled his heart rate better than propranolol previously used.  He denies any signs of GI bleeding.    He is taking lactulose 20 g twice daily, resulting in 3-4 bowel movements daily and Xifaxan 550 mg twice daily for treatment of hepatic encephalopathy.  He continues to have problems with his personality/behavior as his wife reports that he is often shen to angry and now she feels he is depressed.  He is having significant sleep disturbances, being unable to sleep rather than somnolence.    He is currently scheduled for evaluation at [facility]  for April 17.    PAST MEDICAL HISTORY  Past Medical History:   Diagnosis Date   • Anemia 01/10/2020    caused by detox   • Anxiety 10/01/2019    caused by drinking   • Ascites due to alcoholic cirrhosis (CMS/HCC)    • Asthma 01/10/2019    breathing only   • Cirrhosis (CMS/HCC)    • Clotting disorder (CMS/HCC) 10/01/2019    shave cuts bleeding uncontrolible   • Depression 10/01/2019   • Erectile dysfunction 01/10/2020   • GERD (gastroesophageal reflux disease) 1995    use Prilosec   • Headache 02/09/2020    detox and since then   • History of medical problems 01/10/2020    dizziness and balance issues   • Liver disease 2015   • Low back pain 01/10/2020    started with fall out of beach chair not set up right by wif   • Renal insufficiency 02/25/2020   • Urinary tract infection    • Visual impairment 01/10/2020    started with detox        PAST SURGICAL HISTORY  Past Surgical History:   Procedure Laterality Date   • ENDOSCOPY N/A 2/27/2020    Procedure: ESOPHAGOGASTRODUODENOSCOPY;  Surgeon: Charly Lazo MD;  Location: [facility] ENDOSCOPY;  Service: Gastroenterology;  Laterality: N/A;   • PARACENTESIS     • SINUS SURGERY  1997    broken nose        MEDICATIONS:    Current Outpatient Medications:   •  famotidine (PEPCID) 20 MG tablet, Take 1 tablet by mouth At Night As Needed for Heartburn., Disp: 30 tablet, Rfl: 1  •  lactulose (CHRONULAC) 10 GM/15ML solution, Take 30 mL by mouth 3 (Three) Times a Day As Needed for constipation. Titrate for 2 bowel movements a day., Disp: 473 mL, Rfl: 1  •  midodrine (PROAMATINE) 5 MG tablet, Take 1 tablet by mouth 3 (Three) Times a Day., Disp: 90 tablet, Rfl: 1  •  nadolol (CORGARD) 20 MG tablet, Take 1 tablet by mouth Daily., Disp: 30 tablet, Rfl: 5  •  riFAXIMin (Xifaxan) 550 MG tablet, Take 1 tablet by mouth Every 12 (Twelve) Hours., Disp: 60 tablet, Rfl: 11    ALLERGIES  No Known Allergies    FAMILY HISTORY:  Family History   Problem Relation Age of Onset   • Alcohol abuse  Brother         detox 2013   • Liver disease Brother    • Anxiety disorder Son         2013   • Anxiety disorder Son         2019   • Birth defects Son         2001 Club foot   • Birth defects Brother         born blind   • Cancer Father         pancreatic   • Heart disease Mother         heart attack fatal   • Heart attack Mother        SOCIAL HISTORY  Social History     Socioeconomic History   • Marital status:      Spouse name: Not on file   • Number of children: Not on file   • Years of education: Not on file   • Highest education level: Not on file   Social Needs   • Financial resource strain: Not hard at all   • Food insecurity:     Worry: Never true     Inability: Not on file   • Transportation needs:     Medical: No     Non-medical: No   Tobacco Use   • Smoking status: Never Smoker   • Smokeless tobacco: Never Used   Substance and Sexual Activity   • Alcohol use: Not Currently     Comment: not drank since 2/2020   • Drug use: Never   • Sexual activity: Not Currently     Partners: Female       REVIEW OF SYSTEMS  Review of Systems   Constitutional: Positive for activity change, appetite change and fatigue. Negative for chills, diaphoresis, fever, unexpected weight gain and unexpected weight loss.   HENT: Negative for trouble swallowing and voice change.    Gastrointestinal: Negative for abdominal distention, abdominal pain, anal bleeding, blood in stool, constipation, diarrhea, nausea, rectal pain, vomiting, GERD and indigestion.   Musculoskeletal: Positive for gait problem and myalgias.   Skin: Positive for dry skin.   Neurological: Positive for dizziness, weakness and light-headedness.   Psychiatric/Behavioral: Positive for agitation, sleep disturbance and depressed mood.     PHYSICAL EXAM   /85 Comment: Per home monitor  Pulse 85 Comment: Per home monitor  Physical Exam   Constitutional: He is oriented to person, place, and time. He appears well-developed and well-nourished.   HENT:   Head:  Normocephalic and atraumatic.   Eyes: Pupils are equal, round, and reactive to light. Scleral icterus is present.   Pulmonary/Chest: Effort normal.   Abdominal: He exhibits distension.   Neurological: He is alert and oriented to person, place, and time.   Psychiatric: His behavior is normal. His mood appears anxious. He exhibits a depressed mood.     Results Review:  I have reviewed the patient's new clinical results.    ASSESSMENT / PLAN  1. End Stage Liver Disease  2. Cirrhosis, decompensated by ascites, variceal bleed, hepatic encephalopathy  -cirrhosis secondary to ETOH  -MELDNa 31 (3/17 Labs)  Plan:  -[facility] consult moved to sooner appointment:  4/10/2020 @ 15. Patient updated/aware.     3.  Hepatic encephalopathy  Grade I - [place] Criteria  Plan:  -Continue Lactulose Titrate to 2-3 soft BM/day  -Continue Xifaxan 550 mg BID     4.  Ascites/Pedal edema  5. Hepatorenal syndrome  -Unable to tolerate diruetics  Plan:  -Low sodium diet 2 gm/day  -Paracentesis prn  -Consider TIPS however with MELD>15 and bilirubin >4 mg/dL there is a high predicted 30-day mortality. The need for liver transplantation should be discussed.     6. Esophageal varices - bleeding  -Last EGD (2/27/2020): Three columns of nonbleeding grade II varices were found in the lower third of the esophagus, 30 to 38 cm from the incisors.  They were 4 mm in largest diameter.  Stigmata of recent bleeding were evident and red vic signs were present.  -His heart rate is not controlled within the parameters that I would like to see however, I am hesitant to increase his nonselective beta-blocker as his systolic blood pressure continues to dip into the 80s.  Plan:  -Continue Secondary prophylaxis: Nadolol 20 mg.  Target HR 55-65 bpm, systolic BP > 90 mmHg   -Consider TIPS (as above)     # Hepatocellular carcinoma surveillance  - Abdominal imaging every six months  - Last abdominal CT (2/23/2020). Result: extensive ascites and a nodular liver  morphology typical of cirrhosis, no mention of lesions  -Next imaging due August 2020     # Nutrition  -High protein diet  -Low sodium diet 2 gm/day  -Avoid alcohol, avoid NSAIDS     # Health maintenance  -Immunization against HAV #1 of 2 given 2/26/2020  -Immunization against HBV #1 of 3 given 2/26/2020.  #2 due at this time.  He is seen via video visit today so were unable to get his vaccine done.  Hopefully, he can receive this at his visit at  on 4/10/2020.     7.  EtOH abuse  -Abstinent since 2/3/2020  Plan:  -Continue complete abstinence from all alcohol.       Return for further treatment at  Transplant.  I will transfer his care to  at this time and will follow up with him as directed by .  He would like to continue having his paracentesis here at [facility], I will continue to follow him for this.    I discussed the patients findings and my recommendations with patient and family    DENICE Flood

## 2020-04-07 NOTE — PROGRESS NOTES
GASTROENTEROLOGY OFFICE NOTE  Unruly Liang  [id number]  1965    CARE TEAM  Patient Care Team:  Cathy Ware MD as PCP - General (Internal Medicine)    Referring Provider: Cathy Ware MD    Chief Complaint   Patient presents with   • Cirrhosis        HISTORY OF PRESENT ILLNESS:  Mr. Liang is seen today via video visit with EtOH cirrhosis decompensated by hepatic encephalopathy, ascites, and esophageal variceal bleeding with hepatorenal syndrome, unable to tolerate diuretic therapy.  He continues to have paracentesis as needed, about every 2 weeks.  Last paracentesis was on 4/3/2020, 8.5 L was drained.  No history of SBP.  He denies fevers.  He reports he is now having very good urine flow.    EGD on 2/27/2020 with grade 2 varices with stigmata of recent bleeding and red vic sign.  He is currently taking nadolol 20 mg daily.  He is not tolerating this very well.  It causes dizziness and extreme fatigue.  He has thus switched this to taking in the evening rather than in the morning.  He reports he takes it about 7 PM every night and by the time he goes to bed around 11 he can barely make it up the steps his wife has to help him due to extreme dizziness.  He is monitoring his blood pressure and heart rate at home.  Systolic blood pressure has been ranging from , his heart rate has been ranging from 83-97 over the past 3 days.  Nadolol however has controlled his heart rate better than propranolol previously used.  He denies any signs of GI bleeding.    He is taking lactulose 20 g twice daily, resulting in 3-4 bowel movements daily and Xifaxan 550 mg twice daily for treatment of hepatic encephalopathy.  He continues to have problems with his personality/behavior as his wife reports that he is often shen to angry and now she feels he is depressed.  He is having significant sleep disturbances, being unable to sleep rather than somnolence.    He is currently scheduled for evaluation at [facility]  for April 17.    PAST MEDICAL HISTORY  Past Medical History:   Diagnosis Date   • Anemia 01/10/2020    caused by detox   • Anxiety 10/01/2019    caused by drinking   • Ascites due to alcoholic cirrhosis (CMS/HCC)    • Asthma 01/10/2019    breathing only   • Cirrhosis (CMS/HCC)    • Clotting disorder (CMS/HCC) 10/01/2019    shave cuts bleeding uncontrolible   • Depression 10/01/2019   • Erectile dysfunction 01/10/2020   • GERD (gastroesophageal reflux disease) 1995    use Prilosec   • Headache 02/09/2020    detox and since then   • History of medical problems 01/10/2020    dizziness and balance issues   • Liver disease 2015   • Low back pain 01/10/2020    started with fall out of beach chair not set up right by wif   • Renal insufficiency 02/25/2020   • Urinary tract infection    • Visual impairment 01/10/2020    started with detox        PAST SURGICAL HISTORY  Past Surgical History:   Procedure Laterality Date   • ENDOSCOPY N/A 2/27/2020    Procedure: ESOPHAGOGASTRODUODENOSCOPY;  Surgeon: Charly Lazo MD;  Location: [facility] ENDOSCOPY;  Service: Gastroenterology;  Laterality: N/A;   • PARACENTESIS     • SINUS SURGERY  1997    broken nose        MEDICATIONS:    Current Outpatient Medications:   •  famotidine (PEPCID) 20 MG tablet, Take 1 tablet by mouth At Night As Needed for Heartburn., Disp: 30 tablet, Rfl: 1  •  lactulose (CHRONULAC) 10 GM/15ML solution, Take 30 mL by mouth 3 (Three) Times a Day As Needed for constipation. Titrate for 2 bowel movements a day., Disp: 473 mL, Rfl: 1  •  midodrine (PROAMATINE) 5 MG tablet, Take 1 tablet by mouth 3 (Three) Times a Day., Disp: 90 tablet, Rfl: 1  •  nadolol (CORGARD) 20 MG tablet, Take 1 tablet by mouth Daily., Disp: 30 tablet, Rfl: 5  •  riFAXIMin (Xifaxan) 550 MG tablet, Take 1 tablet by mouth Every 12 (Twelve) Hours., Disp: 60 tablet, Rfl: 11    ALLERGIES  No Known Allergies    FAMILY HISTORY:  Family History   Problem Relation Age of Onset   • Alcohol abuse  Brother         detox 2013   • Liver disease Brother    • Anxiety disorder Son         2013   • Anxiety disorder Son         2019   • Birth defects Son         2001 Club foot   • Birth defects Brother         born blind   • Cancer Father         pancreatic   • Heart disease Mother         heart attack fatal   • Heart attack Mother        SOCIAL HISTORY  Social History     Socioeconomic History   • Marital status:      Spouse name: Not on file   • Number of children: Not on file   • Years of education: Not on file   • Highest education level: Not on file   Social Needs   • Financial resource strain: Not hard at all   • Food insecurity:     Worry: Never true     Inability: Not on file   • Transportation needs:     Medical: No     Non-medical: No   Tobacco Use   • Smoking status: Never Smoker   • Smokeless tobacco: Never Used   Substance and Sexual Activity   • Alcohol use: Not Currently     Comment: not drank since 2/2020   • Drug use: Never   • Sexual activity: Not Currently     Partners: Female       REVIEW OF SYSTEMS  Review of Systems   Constitutional: Positive for activity change, appetite change and fatigue. Negative for chills, diaphoresis, fever, unexpected weight gain and unexpected weight loss.   HENT: Negative for trouble swallowing and voice change.    Gastrointestinal: Negative for abdominal distention, abdominal pain, anal bleeding, blood in stool, constipation, diarrhea, nausea, rectal pain, vomiting, GERD and indigestion.   Musculoskeletal: Positive for gait problem and myalgias.   Skin: Positive for dry skin.   Neurological: Positive for dizziness, weakness and light-headedness.   Psychiatric/Behavioral: Positive for agitation, sleep disturbance and depressed mood.     PHYSICAL EXAM   /85 Comment: Per home monitor  Pulse 85 Comment: Per home monitor  Physical Exam   Constitutional: He is oriented to person, place, and time. He appears well-developed and well-nourished.   HENT:   Head:  Normocephalic and atraumatic.   Eyes: Pupils are equal, round, and reactive to light. Scleral icterus is present.   Pulmonary/Chest: Effort normal.   Abdominal: He exhibits distension.   Neurological: He is alert and oriented to person, place, and time.   Psychiatric: His behavior is normal. His mood appears anxious. He exhibits a depressed mood.     Results Review:  I have reviewed the patient's new clinical results.    ASSESSMENT / PLAN  1. End Stage Liver Disease  2. Cirrhosis, decompensated by ascites, variceal bleed, hepatic encephalopathy  -cirrhosis secondary to ETOH  -MELDNa 31 (3/17 Labs)  Plan:  -[facility] consult moved to sooner appointment:  4/10/2020 @ 15. Patient updated/aware.     3.  Hepatic encephalopathy  Grade I - Delta Criteria  Plan:  -Continue Lactulose Titrate to 2-3 soft BM/day  -Continue Xifaxan 550 mg BID     4.  Ascites/Pedal edema  5. Hepatorenal syndrome  -Unable to tolerate diruetics  Plan:  -Low sodium diet 2 gm/day  -Paracentesis prn  -Consider TIPS however with MELD>15 and bilirubin >4 mg/dL there is a high predicted 30-day mortality. The need for liver transplantation should be discussed.     6. Esophageal varices - bleeding  -Last EGD (2/27/2020): Three columns of nonbleeding grade II varices were found in the lower third of the esophagus, 30 to 38 cm from the incisors.  They were 4 mm in largest diameter.  Stigmata of recent bleeding were evident and red vic signs were present.  -His heart rate is not controlled within the parameters that I would like to see however, I am hesitant to increase his nonselective beta-blocker as his systolic blood pressure continues to dip into the 80s.  Plan:  -Continue Secondary prophylaxis: Nadolol 20 mg.  Target HR 55-65 bpm, systolic BP > 90 mmHg   -Consider TIPS (as above)     # Hepatocellular carcinoma surveillance  - Abdominal imaging every six months  - Last abdominal CT (2/23/2020). Result: extensive ascites and a nodular liver  morphology typical of cirrhosis, no mention of lesions  -Next imaging due August 2020     # Nutrition  -High protein diet  -Low sodium diet 2 gm/day  -Avoid alcohol, avoid NSAIDS     # Health maintenance  -Immunization against HAV #1 of 2 given 2/26/2020  -Immunization against HBV #1 of 3 given 2/26/2020.  #2 due at this time.  He is seen via video visit today so were unable to get his vaccine done.  Hopefully, he can receive this at his visit at  on 4/10/2020.     7.  EtOH abuse  -Abstinent since 2/3/2020  Plan:  -Continue complete abstinence from all alcohol.       Return for further treatment at  Transplant.  I will transfer his care to  at this time and will follow up with him as directed by .  He would like to continue having his paracentesis here at [facility], I will continue to follow him for this.    I discussed the patients findings and my recommendations with patient and family    DENICE Flood

## 2020-04-13 ENCOUNTER — PREP FOR SURGERY (OUTPATIENT)
Dept: OTHER | Facility: HOSPITAL | Age: 55
End: 2020-04-13

## 2020-04-13 ENCOUNTER — TELEPHONE (OUTPATIENT)
Dept: GASTROENTEROLOGY | Facility: CLINIC | Age: 55
End: 2020-04-13

## 2020-04-13 DIAGNOSIS — I85.01 BLEEDING ESOPHAGEAL VARICES, UNSPECIFIED ESOPHAGEAL VARICES TYPE (HCC): ICD-10-CM

## 2020-04-13 DIAGNOSIS — K70.31 ALCOHOLIC CIRRHOSIS OF LIVER WITH ASCITES (HCC): Primary | ICD-10-CM

## 2020-04-13 DIAGNOSIS — K74.69 OTHER CIRRHOSIS OF LIVER (HCC): ICD-10-CM

## 2020-04-13 NOTE — TELEPHONE ENCOUNTER
Patient needs scheduled this week in the morning for EGD with Varices banding. Please let me know when patient is scheduled.     Will you please call and schedule?    Thank you,    MICHAEL Rose

## 2020-04-14 ENCOUNTER — PREP FOR SURGERY (OUTPATIENT)
Dept: OTHER | Facility: HOSPITAL | Age: 55
End: 2020-04-14

## 2020-04-14 ENCOUNTER — ANESTHESIA EVENT (OUTPATIENT)
Dept: GASTROENTEROLOGY | Facility: HOSPITAL | Age: 55
End: 2020-04-14

## 2020-04-14 ENCOUNTER — TELEPHONE (OUTPATIENT)
Dept: GASTROENTEROLOGY | Facility: CLINIC | Age: 55
End: 2020-04-14

## 2020-04-14 DIAGNOSIS — K70.31 ALCOHOLIC CIRRHOSIS OF LIVER WITH ASCITES (HCC): ICD-10-CM

## 2020-04-14 DIAGNOSIS — K74.69 OTHER CIRRHOSIS OF LIVER (HCC): Primary | ICD-10-CM

## 2020-04-14 PROBLEM — I85.01 BLEEDING ESOPHAGEAL VARICES (HCC): Status: ACTIVE | Noted: 2020-04-14

## 2020-04-14 RX ORDER — ALBUMIN (HUMAN) 12.5 G/50ML
12.5 SOLUTION INTRAVENOUS ONCE
Status: CANCELLED | OUTPATIENT
Start: 2020-04-14 | End: 2020-04-14

## 2020-04-15 ENCOUNTER — HOSPITAL ENCOUNTER (OUTPATIENT)
Facility: HOSPITAL | Age: 55
Discharge: HOME | End: 2020-04-16
Attending: INTERNAL MEDICINE | Admitting: INTERNAL MEDICINE

## 2020-04-15 ENCOUNTER — ANESTHESIA (OUTPATIENT)
Dept: GASTROENTEROLOGY | Facility: HOSPITAL | Age: 55
End: 2020-04-15

## 2020-04-15 ENCOUNTER — APPOINTMENT (OUTPATIENT)
Dept: CT IMAGING | Facility: HOSPITAL | Age: 55
End: 2020-04-15

## 2020-04-15 DIAGNOSIS — K70.31 ALCOHOLIC CIRRHOSIS OF LIVER WITH ASCITES (HCC): ICD-10-CM

## 2020-04-15 DIAGNOSIS — K70.31 ALCOHOLIC CIRRHOSIS OF LIVER WITH ASCITES (HCC): Primary | ICD-10-CM

## 2020-04-15 PROBLEM — E87.5 HYPERKALEMIA: Status: ACTIVE | Noted: 2020-04-15

## 2020-04-15 PROBLEM — N18.30 CKD (CHRONIC KIDNEY DISEASE), STAGE III (HCC): Status: ACTIVE | Noted: 2020-04-15

## 2020-04-15 LAB
ALBUMIN SERPL-MCNC: 2.8 G/DL (ref 3.5–5.2)
ALBUMIN/GLOB SERPL: 0.7 G/DL
ALP SERPL-CCNC: 264 U/L (ref 39–117)
ALT SERPL W P-5'-P-CCNC: 35 U/L (ref 1–41)
ANION GAP SERPL CALCULATED.3IONS-SCNC: 8 MMOL/L (ref 5–15)
ANION GAP SERPL CALCULATED.3IONS-SCNC: 8 MMOL/L (ref 5–15)
APPEARANCE FLD: CLEAR
AST SERPL-CCNC: 96 U/L (ref 1–40)
BILIRUB SERPL-MCNC: 5 MG/DL (ref 0.2–1.2)
BUN BLD-MCNC: 40 MG/DL (ref 6–20)
BUN BLD-MCNC: 46 MG/DL (ref 6–20)
BUN/CREAT SERPL: 24.4 (ref 7–25)
BUN/CREAT SERPL: 28.4 (ref 7–25)
CALCIUM SPEC-SCNC: 8.8 MG/DL (ref 8.6–10.5)
CALCIUM SPEC-SCNC: 8.9 MG/DL (ref 8.6–10.5)
CHLORIDE SERPL-SCNC: 95 MMOL/L (ref 98–107)
CHLORIDE SERPL-SCNC: 96 MMOL/L (ref 98–107)
CO2 SERPL-SCNC: 21 MMOL/L (ref 22–29)
CO2 SERPL-SCNC: 23 MMOL/L (ref 22–29)
COLOR FLD: YELLOW
CREAT BLD-MCNC: 1.62 MG/DL (ref 0.76–1.27)
CREAT BLD-MCNC: 1.64 MG/DL (ref 0.76–1.27)
GFR SERPL CREATININE-BSD FRML MDRD: 44 ML/MIN/1.73
GFR SERPL CREATININE-BSD FRML MDRD: 45 ML/MIN/1.73
GLOBULIN UR ELPH-MCNC: 4.2 GM/DL
GLUCOSE BLD-MCNC: 107 MG/DL (ref 65–99)
GLUCOSE BLD-MCNC: 148 MG/DL (ref 65–99)
GLUCOSE BLDC GLUCOMTR-MCNC: 111 MG/DL (ref 70–130)
HCT VFR BLD AUTO: 32.7 % (ref 37.5–51)
HGB BLD-MCNC: 10.6 G/DL (ref 13–17.7)
LYMPHOCYTES NFR FLD MANUAL: 20 %
MACROPHAGE FLUID: 18 %
MESOTHL CELL NFR FLD MANUAL: 4 %
MONOCYTES NFR FLD: 32 %
NEUTROPHILS NFR FLD MANUAL: 26 %
POTASSIUM BLD-SCNC: 5.5 MMOL/L (ref 3.5–5.2)
POTASSIUM BLD-SCNC: 6 MMOL/L (ref 3.5–5.2)
POTASSIUM BLD-SCNC: 6.3 MMOL/L (ref 3.5–5.2)
POTASSIUM BLD-SCNC: 6.3 MMOL/L (ref 3.5–5.2)
PROT SERPL-MCNC: 7 G/DL (ref 6–8.5)
RBC # FLD AUTO: <2000 /MM3
SODIUM BLD-SCNC: 125 MMOL/L (ref 136–145)
SODIUM BLD-SCNC: 126 MMOL/L (ref 136–145)
WBC # FLD AUTO: 115 /MM3

## 2020-04-15 PROCEDURE — 25010000003 LIDOCAINE 1 % SOLUTION: Performed by: RADIOLOGY

## 2020-04-15 PROCEDURE — G0378 HOSPITAL OBSERVATION PER HR: HCPCS

## 2020-04-15 PROCEDURE — 99220 PR INITIAL OBSERVATION CARE/DAY 70 MINUTES: CPT | Performed by: INTERNAL MEDICINE

## 2020-04-15 PROCEDURE — 84132 ASSAY OF SERUM POTASSIUM: CPT | Performed by: INTERNAL MEDICINE

## 2020-04-15 PROCEDURE — 96374 THER/PROPH/DIAG INJ IV PUSH: CPT

## 2020-04-15 PROCEDURE — 80053 COMPREHEN METABOLIC PANEL: CPT | Performed by: ANESTHESIOLOGY

## 2020-04-15 PROCEDURE — 25010000002 ALBUMIN HUMAN 25% PER 50 ML: Performed by: PHYSICIAN ASSISTANT

## 2020-04-15 PROCEDURE — 85014 HEMATOCRIT: CPT | Performed by: ANESTHESIOLOGY

## 2020-04-15 PROCEDURE — G0463 HOSPITAL OUTPT CLINIC VISIT: HCPCS | Performed by: INTERNAL MEDICINE

## 2020-04-15 PROCEDURE — 96375 TX/PRO/DX INJ NEW DRUG ADDON: CPT

## 2020-04-15 PROCEDURE — 75989 ABSCESS DRAINAGE UNDER X-RAY: CPT

## 2020-04-15 PROCEDURE — 87205 SMEAR GRAM STAIN: CPT | Performed by: INTERNAL MEDICINE

## 2020-04-15 PROCEDURE — P9047 ALBUMIN (HUMAN), 25%, 50ML: HCPCS | Performed by: PHYSICIAN ASSISTANT

## 2020-04-15 PROCEDURE — 25010000002 CALCIUM GLUCONATE PER 10 ML: Performed by: INTERNAL MEDICINE

## 2020-04-15 PROCEDURE — 99244 OFF/OP CNSLTJ NEW/EST MOD 40: CPT | Performed by: PHYSICIAN ASSISTANT

## 2020-04-15 PROCEDURE — 84132 ASSAY OF SERUM POTASSIUM: CPT | Performed by: ANESTHESIOLOGY

## 2020-04-15 PROCEDURE — 63710000001 INSULIN REGULAR HUMAN PER 5 UNITS: Performed by: INTERNAL MEDICINE

## 2020-04-15 PROCEDURE — 89051 BODY FLUID CELL COUNT: CPT | Performed by: INTERNAL MEDICINE

## 2020-04-15 PROCEDURE — C1729 CATH, DRAINAGE: HCPCS

## 2020-04-15 PROCEDURE — 82962 GLUCOSE BLOOD TEST: CPT

## 2020-04-15 PROCEDURE — 87015 SPECIMEN INFECT AGNT CONCNTJ: CPT | Performed by: INTERNAL MEDICINE

## 2020-04-15 PROCEDURE — 94644 CONT INHLJ TX 1ST HOUR: CPT

## 2020-04-15 PROCEDURE — 87070 CULTURE OTHR SPECIMN AEROBIC: CPT | Performed by: INTERNAL MEDICINE

## 2020-04-15 PROCEDURE — 93005 ELECTROCARDIOGRAM TRACING: CPT | Performed by: INTERNAL MEDICINE

## 2020-04-15 PROCEDURE — 93010 ELECTROCARDIOGRAM REPORT: CPT | Performed by: INTERNAL MEDICINE

## 2020-04-15 PROCEDURE — 85018 HEMOGLOBIN: CPT | Performed by: ANESTHESIOLOGY

## 2020-04-15 PROCEDURE — 80048 BASIC METABOLIC PNL TOTAL CA: CPT | Performed by: INTERNAL MEDICINE

## 2020-04-15 RX ORDER — ALBUMIN (HUMAN) 12.5 G/50ML
12.5 SOLUTION INTRAVENOUS ONCE AS NEEDED
Status: DISCONTINUED | OUTPATIENT
Start: 2020-04-15 | End: 2020-04-16 | Stop reason: HOSPADM

## 2020-04-15 RX ORDER — SODIUM CHLORIDE 0.9 % (FLUSH) 0.9 %
10 SYRINGE (ML) INJECTION EVERY 12 HOURS SCHEDULED
Status: DISCONTINUED | OUTPATIENT
Start: 2020-04-15 | End: 2020-04-15 | Stop reason: HOSPADM

## 2020-04-15 RX ORDER — FAMOTIDINE 20 MG/1
20 TABLET, FILM COATED ORAL
Status: DISCONTINUED | OUTPATIENT
Start: 2020-04-15 | End: 2020-04-15 | Stop reason: HOSPADM

## 2020-04-15 RX ORDER — NADOLOL 20 MG/1
20 TABLET ORAL DAILY
Status: DISCONTINUED | OUTPATIENT
Start: 2020-04-15 | End: 2020-04-16 | Stop reason: HOSPADM

## 2020-04-15 RX ORDER — SODIUM CHLORIDE, SODIUM LACTATE, POTASSIUM CHLORIDE, AND CALCIUM CHLORIDE .6; .31; .03; .02 G/100ML; G/100ML; G/100ML; G/100ML
10 INJECTION, SOLUTION INTRAVENOUS ONCE
Status: COMPLETED | OUTPATIENT
Start: 2020-04-15 | End: 2020-04-15

## 2020-04-15 RX ORDER — SODIUM CHLORIDE 0.9 % (FLUSH) 0.9 %
10 SYRINGE (ML) INJECTION AS NEEDED
Status: DISCONTINUED | OUTPATIENT
Start: 2020-04-15 | End: 2020-04-16 | Stop reason: HOSPADM

## 2020-04-15 RX ORDER — FAMOTIDINE 10 MG/ML
20 INJECTION, SOLUTION INTRAVENOUS ONCE
Status: COMPLETED | OUTPATIENT
Start: 2020-04-15 | End: 2020-04-15

## 2020-04-15 RX ORDER — SODIUM POLYSTYRENE SULFONATE 15 G/60ML
15 SUSPENSION ORAL; RECTAL ONCE
Status: DISCONTINUED | OUTPATIENT
Start: 2020-04-15 | End: 2020-04-15 | Stop reason: RX

## 2020-04-15 RX ORDER — ALBUMIN (HUMAN) 12.5 G/50ML
50 SOLUTION INTRAVENOUS WEEKLY
Status: DISCONTINUED | OUTPATIENT
Start: 2020-04-15 | End: 2020-04-16 | Stop reason: HOSPADM

## 2020-04-15 RX ORDER — LACTULOSE 10 G/15ML
20 SOLUTION ORAL 3 TIMES DAILY PRN
Status: DISCONTINUED | OUTPATIENT
Start: 2020-04-15 | End: 2020-04-16 | Stop reason: HOSPADM

## 2020-04-15 RX ORDER — SODIUM CHLORIDE, SODIUM LACTATE, POTASSIUM CHLORIDE, CALCIUM CHLORIDE 600; 310; 30; 20 MG/100ML; MG/100ML; MG/100ML; MG/100ML
9 INJECTION, SOLUTION INTRAVENOUS CONTINUOUS PRN
Status: DISCONTINUED | OUTPATIENT
Start: 2020-04-15 | End: 2020-04-15 | Stop reason: HOSPADM

## 2020-04-15 RX ORDER — ALBUTEROL SULFATE 2.5 MG/3ML
10 SOLUTION RESPIRATORY (INHALATION) ONCE
Status: COMPLETED | OUTPATIENT
Start: 2020-04-15 | End: 2020-04-15

## 2020-04-15 RX ORDER — BISACODYL 5 MG/1
5 TABLET, DELAYED RELEASE ORAL DAILY PRN
Status: DISCONTINUED | OUTPATIENT
Start: 2020-04-15 | End: 2020-04-16 | Stop reason: HOSPADM

## 2020-04-15 RX ORDER — FAMOTIDINE 20 MG/1
20 TABLET, FILM COATED ORAL NIGHTLY PRN
Status: DISCONTINUED | OUTPATIENT
Start: 2020-04-15 | End: 2020-04-16 | Stop reason: HOSPADM

## 2020-04-15 RX ORDER — SODIUM CHLORIDE 0.9 % (FLUSH) 0.9 %
10 SYRINGE (ML) INJECTION EVERY 12 HOURS SCHEDULED
Status: DISCONTINUED | OUTPATIENT
Start: 2020-04-15 | End: 2020-04-16 | Stop reason: HOSPADM

## 2020-04-15 RX ORDER — LIDOCAINE HYDROCHLORIDE 10 MG/ML
20 INJECTION, SOLUTION INFILTRATION; PERINEURAL ONCE
Status: COMPLETED | OUTPATIENT
Start: 2020-04-15 | End: 2020-04-15

## 2020-04-15 RX ORDER — SODIUM CHLORIDE 0.9 % (FLUSH) 0.9 %
10 SYRINGE (ML) INJECTION AS NEEDED
Status: DISCONTINUED | OUTPATIENT
Start: 2020-04-15 | End: 2020-04-15 | Stop reason: HOSPADM

## 2020-04-15 RX ORDER — ALBUMIN (HUMAN) 12.5 G/50ML
50 SOLUTION INTRAVENOUS WEEKLY
Status: CANCELLED | OUTPATIENT
Start: 2020-04-15

## 2020-04-15 RX ORDER — LIDOCAINE HYDROCHLORIDE 10 MG/ML
0.5 INJECTION, SOLUTION EPIDURAL; INFILTRATION; INTRACAUDAL; PERINEURAL ONCE AS NEEDED
Status: DISCONTINUED | OUTPATIENT
Start: 2020-04-15 | End: 2020-04-15 | Stop reason: HOSPADM

## 2020-04-15 RX ORDER — DEXTROSE MONOHYDRATE 25 G/50ML
50 INJECTION, SOLUTION INTRAVENOUS ONCE
Status: COMPLETED | OUTPATIENT
Start: 2020-04-15 | End: 2020-04-15

## 2020-04-15 RX ORDER — MIDODRINE HYDROCHLORIDE 5 MG/1
5 TABLET ORAL
Status: DISCONTINUED | OUTPATIENT
Start: 2020-04-15 | End: 2020-04-16 | Stop reason: HOSPADM

## 2020-04-15 RX ADMIN — LIDOCAINE HYDROCHLORIDE 20 ML: 10 INJECTION, SOLUTION INFILTRATION; PERINEURAL at 14:53

## 2020-04-15 RX ADMIN — CALCIUM GLUCONATE 1 G: 98 INJECTION, SOLUTION INTRAVENOUS at 13:23

## 2020-04-15 RX ADMIN — NADOLOL 20 MG: 20 TABLET ORAL at 16:30

## 2020-04-15 RX ADMIN — ALBUTEROL SULFATE 10 MG: 2.5 SOLUTION RESPIRATORY (INHALATION) at 13:13

## 2020-04-15 RX ADMIN — RIFAXIMIN 550 MG: 550 TABLET ORAL at 20:37

## 2020-04-15 RX ADMIN — FAMOTIDINE 20 MG: 10 INJECTION INTRAVENOUS at 08:17

## 2020-04-15 RX ADMIN — FAMOTIDINE 20 MG: 20 TABLET ORAL at 20:38

## 2020-04-15 RX ADMIN — SODIUM ZIRCONIUM CYCLOSILICATE 10 G: 10 POWDER, FOR SUSPENSION ORAL at 16:30

## 2020-04-15 RX ADMIN — SODIUM CHLORIDE, POTASSIUM CHLORIDE, SODIUM LACTATE AND CALCIUM CHLORIDE 10 ML/HR: 600; 310; 30; 20 INJECTION, SOLUTION INTRAVENOUS at 08:18

## 2020-04-15 RX ADMIN — ALBUMIN HUMAN 50 G: 0.25 SOLUTION INTRAVENOUS at 20:37

## 2020-04-15 RX ADMIN — DEXTROSE 50 % IN WATER (D50W) INTRAVENOUS SYRINGE 50 ML: at 13:23

## 2020-04-15 RX ADMIN — INSULIN HUMAN 10 UNITS: 100 INJECTION, SOLUTION PARENTERAL at 13:23

## 2020-04-15 RX ADMIN — SODIUM CHLORIDE, PRESERVATIVE FREE 10 ML: 5 INJECTION INTRAVENOUS at 20:37

## 2020-04-15 NOTE — PERIOPERATIVE NURSING NOTE
Critical Potassium result received verbally from lab of 6.3.  Dr. Mejia notified.  He says patient is cancelled to have anesthesia today.  Dr. Estevez notified of Potassium result of 6.3.

## 2020-04-15 NOTE — ANESTHESIA PREPROCEDURE EVALUATION
Anesthesia Evaluation     Patient summary reviewed and Nursing notes reviewed   NPO Solid Status: > 8 hours  NPO Liquid Status: > 8 hours           Airway   Mallampati: II  TM distance: >3 FB  Neck ROM: full  No difficulty expected  Dental      Pulmonary    (+) asthma,  (-) COPD, shortness of breath, recent URI, not a smoker, no home oxygen  Cardiovascular   Exercise tolerance: good (4-7 METS)    (+) hypertension,   (-) past MI, dysrhythmias, angina, hyperlipidemia      Neuro/Psych  (+) headaches, psychiatric history,     (-) seizures, CVA    ROS Comment: hnepatic encephalopathy   GI/Hepatic/Renal/Endo    (+)  GERD,  liver disease (cirrhosis ) history of elevated LFT, renal disease CRI,   (-) no thyroid disorder    ROS Comment: Eso varices  On transplant list [facility]   END stage liver disease     Musculoskeletal     Abdominal    Substance History   (+) alcohol use,      OB/GYN          Other        ROS/Med Hx Other: Low Na  High creat   Low BP    Used 100 mg propofol in feb                 Anesthesia Plan    ASA 4     general   (Case cancelled after stat K was 6.3   )  intravenous induction     Anesthetic plan, all risks, benefits, and alternatives have been provided, discussed and informed consent has been obtained with: patient.    Plan discussed with CRNA.

## 2020-04-15 NOTE — H&P
[facility] Medicine Services  HISTORY AND PHYSICAL    Patient Name: Unruly Liang  : 1965  MRN: [id number]  Primary Care Physician: Cathy Ware MD  Date of admission: 4/15/2020      Subjective   Subjective     Chief Complaint:  Hyperkalemia    HPI:  Unruly Liang is a 54 y.o. male with a past medical history of alcoholic cirrhosis with associated ascites and esophageal variceal bleeding and hepatorenal syndrome who was directly admitted from GI clinic for hyperkalemia.  Patient had a planned EGD with esophageal banding today, however, his potassium was noted to be 6.3 and he was sent to the hospital for treatment.  On arrival he was screened for COVID 19 infection and screening questions were negative.  He was in his normal state of health prior to coming to the hospital, is having some abdominal distention associated with decreased appetite, but denies increased cough, shortness of breath, or fever at home.  He does not work in healthcare facility or has not recently been living in a healthcare facility.  Him and his wife live at home and they are both retired and he says he has not left his house in about a month.    Review of Systems   Gen- No fevers, chills  CV- No chest pain, palpitations  Resp- No cough, dyspnea     All other systems reviewed and are negative.     Personal History     Past Medical History:   Diagnosis Date   • Anemia 01/10/2020    caused by detox   • Anxiety 10/01/2019    caused by drinking   • Ascites due to alcoholic cirrhosis (CMS/HCC)    • Cirrhosis (CMS/HCC)    • Clotting disorder (CMS/HCC) 10/01/2019    shave cuts bleeding uncontrolible   • Depression 10/01/2019   • Erectile dysfunction 01/10/2020   • GERD (gastroesophageal reflux disease) 1995    use Prilosec   • Headache 2020    detox and since then   • History of medical problems 01/10/2020    dizziness and balance issues   • Hypertension    • Liver disease    • Liver disease    •  Low back pain 01/10/2020    started with fall out of beach chair not set up right by wif   • Renal insufficiency 02/25/2020   • Urinary tract infection    • Visual impairment 01/10/2020    started with detox       Past Surgical History:   Procedure Laterality Date   • ENDOSCOPY N/A 2/27/2020    Procedure: ESOPHAGOGASTRODUODENOSCOPY;  Surgeon: Charly Lazo MD;  Location: [facility] ENDOSCOPY;  Service: Gastroenterology;  Laterality: N/A;   • PARACENTESIS     • SINUS SURGERY  1997    broken nose       Family History: family history includes Alcohol abuse in his brother; Anxiety disorder in his son and son; Birth defects in his brother and son; Cancer in his father; Heart attack in his mother; Heart disease in his mother; Liver disease in his brother. Otherwise pertinent FHx was reviewed and unremarkable.     Social History:  reports that he has never smoked. He has never used smokeless tobacco. He reports that he drank alcohol. He reports that he does not use drugs.  Social History     Social History Narrative   • Not on file       Medications:  Available home medication information reviewed.  Medications Prior to Admission   Medication Sig Dispense Refill Last Dose   • famotidine (PEPCID) 20 MG tablet Take 1 tablet by mouth At Night As Needed for Heartburn. 30 tablet 1 4/14/2020 at Unknown time   • lactulose (CHRONULAC) 10 GM/15ML solution Take 30 mL by mouth 3 (Three) Times a Day As Needed for constipation. Titrate for 2 bowel movements a day. 473 mL 1 4/14/2020 at Unknown time   • riFAXIMin (Xifaxan) 550 MG tablet Take 1 tablet by mouth Every 12 (Twelve) Hours. 60 tablet 11 4/14/2020 at Unknown time   • midodrine (PROAMATINE) 5 MG tablet Take 1 tablet by mouth 3 (Three) Times a Day. 90 tablet 1 Patient Taking Differently   • nadolol (CORGARD) 20 MG tablet Take 1 tablet by mouth Daily. 30 tablet 5 4/3/2020 at Unknown time       No Known Allergies    Objective   Objective     Vital Signs:   Temp:  [97.1 °F (36.2  °C)-98.4 °F (36.9 °C)] 98.4 °F (36.9 °C)  Heart Rate:  [114-121] 116  Resp:  [12-16] 16  BP: (113-128)/(84-94) 113/84        Physical Exam   Constitutional: Awake, alert  Eyes: PERRLA, sclerae anicteric, no conjunctival injection  HENT: NCAT, mucous membranes moist  Neck: Supple, no thyromegaly, no lymphadenopathy, trachea midline  Respiratory: Clear to auscultation bilaterally, nonlabored respirations on room air  Cardiovascular: Tachycardic, no murmurs, rubs, or gallops, palpable pedal pulses bilaterally, no lower extremity edema  Gastrointestinal: Positive bowel sounds, soft, nontender, distended  Psychiatric: Appropriate affect, cooperative  Neurologic: Oriented x 3, Cranial Nerves grossly intact to confrontation, speech clear  Skin: No rashes on exposed skin      Results Reviewed:  I have personally reviewed current lab and radiology data.    Results from last 7 days   Lab Units 04/15/20  0808   HEMOGLOBIN g/dL 10.6*   HEMATOCRIT % 32.7*     Results from last 7 days   Lab Units 04/15/20  1300   SODIUM mmol/L 126*   POTASSIUM mmol/L 6.0*   CHLORIDE mmol/L 95*   CO2 mmol/L 23.0   BUN mg/dL 40*   CREATININE mg/dL 1.64*   GLUCOSE mg/dL 148*   CALCIUM mg/dL 8.8   ALT (SGPT) U/L 35   AST (SGOT) U/L 96*     Estimated Creatinine Clearance: 56.8 mL/min (A) (by C-G formula based on SCr of 1.64 mg/dL (H)).  Brief Urine Lab Results  (Last result in the past 365 days)      Color   Clarity   Blood   Leuk Est   Nitrite   Protein   CREAT   Urine HCG        02/23/20 1425 Dark Yellow Clear Negative Trace Positive Negative             Imaging Results (Last 24 Hours)     ** No results found for the last 24 hours. **             Assessment/Plan   Assessment & Plan     Active Hospital Problems    Diagnosis POA   • **Hyperkalemia [E87.5] Yes   • CKD (chronic kidney disease), stage III (CMS/HCC) [N18.3] Yes   • Bleeding esophageal varices (CMS/HCC) [I85.01] Unknown     Added automatically from request for surgery [id number]     •  Hyponatremia [E87.1] Yes   • Alcoholic cirrhosis of liver with ascites (CMS/HCC) [K70.31] Yes       Unruly Liang is a 54 y.o. male with a past medical history of alcoholic cirrhosis with associated ascites and esophageal varices and hepatorenal syndrome who was directly admitted from GI clinic for hyperkalemia.    Hyperkalemia  CKD  - Patient apparently has been eating a lot of potassium rich foods including bananas and oranges and a lot of fruits, will place on low potassium diet here  - Creatinine at baseline  - EKG sinus tachycardia with left axis deviation, no prior to compare to, but patient denies any significant chest pain  -Ordered insulin/glucose, albuterol, calcium gluconate, and Kayexalate  -Repeat CMP tomorrow    Alcoholic cirrhosis with ascites and esophageal varices  Hepatorenal syndrome  - We will plan for EGD tomorrow with Dr. Mills, GI consulted  - Plan for CT-guided paracentesis today  - Continue nadolol, rifaximin, and lactulose as needed for constipation    Chronic hyponatremia, stable  Sodium 126, was also low last month  -Continue to monitor CMP daily    DVT prophylaxis: Mechanical    CODE STATUS:    Code Status and Medical Interventions:   Ordered at: 04/15/20 1412     Limited Support to NOT Include:    Intubation     Level Of Support Discussed With:    Patient     Code Status:    No CPR     Medical Interventions (Level of Support Prior to Arrest):    Limited       Admission Status:  I believe this patient meets OBSERVATION status, however if further evaluation or treatment plans warrant, status may change.  Based upon current information, I predict patient's care encounter to be less than or equal to 2 midnights.      Electronically signed by Juanita Arvizu MD, 04/15/20, 2:16 PM.

## 2020-04-15 NOTE — H&P (VIEW-ONLY)
[facility] Medicine Services  HISTORY AND PHYSICAL    Patient Name: Unruly Liang  : 1965  MRN: [id number]  Primary Care Physician: Cathy Ware MD  Date of admission: 4/15/2020      Subjective   Subjective     Chief Complaint:  Hyperkalemia    HPI:  Unruly Liang is a 54 y.o. male with a past medical history of alcoholic cirrhosis with associated ascites and esophageal variceal bleeding and hepatorenal syndrome who was directly admitted from GI clinic for hyperkalemia.  Patient had a planned EGD with esophageal banding today, however, his potassium was noted to be 6.3 and he was sent to the hospital for treatment.  On arrival he was screened for COVID 19 infection and screening questions were negative.  He was in his normal state of health prior to coming to the hospital, is having some abdominal distention associated with decreased appetite, but denies increased cough, shortness of breath, or fever at home.  He does not work in healthcare facility or has not recently been living in a healthcare facility.  Him and his wife live at home and they are both retired and he says he has not left his house in about a month.    Review of Systems   Gen- No fevers, chills  CV- No chest pain, palpitations  Resp- No cough, dyspnea     All other systems reviewed and are negative.     Personal History     Past Medical History:   Diagnosis Date   • Anemia 01/10/2020    caused by detox   • Anxiety 10/01/2019    caused by drinking   • Ascites due to alcoholic cirrhosis (CMS/HCC)    • Cirrhosis (CMS/HCC)    • Clotting disorder (CMS/HCC) 10/01/2019    shave cuts bleeding uncontrolible   • Depression 10/01/2019   • Erectile dysfunction 01/10/2020   • GERD (gastroesophageal reflux disease) 1995    use Prilosec   • Headache 2020    detox and since then   • History of medical problems 01/10/2020    dizziness and balance issues   • Hypertension    • Liver disease    • Liver disease    •  Low back pain 01/10/2020    started with fall out of beach chair not set up right by wif   • Renal insufficiency 02/25/2020   • Urinary tract infection    • Visual impairment 01/10/2020    started with detox       Past Surgical History:   Procedure Laterality Date   • ENDOSCOPY N/A 2/27/2020    Procedure: ESOPHAGOGASTRODUODENOSCOPY;  Surgeon: Charly Lazo MD;  [facility]: [facility] END[facility]COPY;  Service: Gastroenterology;  Laterality: N/A;   • PARACENTESIS     • SINUS SURGERY  1997    broken nose       Family History: family history includes Alcohol abuse in his brother; Anxiety disorder in his son and son; Birth defects in his brother and son; Cancer in his father; Heart attack in his mother; Heart disease in his mother; Liver disease in his brother. Otherwise pertinent FHx was reviewed and unremarkable.     Social History:  reports that he has never smoked. He has never used smokeless tobacco. He reports that he drank alcohol. He reports that he does not use drugs.  Social History     Social History Narrative   • Not on file       Medications:  Available home medication information reviewed.  Medications Prior to Admission   Medication Sig Dispense Refill Last Dose   • famotidine (PEPCID) 20 MG tablet Take 1 tablet by mouth At Night As Needed for Heartburn. 30 tablet 1 4/14/2020 at Unknown time   • lactulose (CHRONULAC) 10 GM/15ML solution Take 30 mL by mouth 3 (Three) Times a Day As Needed for constipation. Titrate for 2 bowel movements a day. 473 mL 1 4/14/2020 at Unknown time   • riFAXIMin (Xifaxan) 550 MG tablet Take 1 tablet by mouth Every 12 (Twelve) Hours. 60 tablet 11 4/14/2020 at Unknown time   • midodrine (PROAMATINE) 5 MG tablet Take 1 tablet by mouth 3 (Three) Times a Day. 90 tablet 1 Patient Taking Differently   • nadolol (CORGARD) 20 MG tablet Take 1 tablet by mouth Daily. 30 tablet 5 4/3/2020 at Unknown time       No Known Allergies    Objective   Objective     Vital Signs:   Temp:  [97.1 °F (36.2  °C)-98.4 °F (36.9 °C)] 98.4 °F (36.9 °C)  Heart Rate:  [114-121] 116  Resp:  [12-16] 16  BP: (113-128)/(84-94) 113/84        Physical Exam   Constitutional: Awake, alert  Eyes: PERRLA, sclerae anicteric, no conjunctival injection  HENT: NCAT, mucous membranes moist  Neck: Supple, no thyromegaly, no lymphadenopathy, trachea midline  Respiratory: Clear to auscultation bilaterally, nonlabored respirations on room air  Cardiovascular: Tachycardic, no murmurs, rubs, or gallops, palpable pedal pulses bilaterally, no lower extremity edema  Gastrointestinal: Positive bowel sounds, soft, nontender, distended  Psychiatric: Appropriate affect, cooperative  Neurologic: Oriented x 3, Cranial Nerves grossly intact to confrontation, speech clear  Skin: No rashes on exposed skin      Results Reviewed:  I have personally reviewed current lab and radiology data.    Results from last 7 days   Lab Units 04/15/20  0808   HEMOGLOBIN g/dL 10.6*   HEMATOCRIT % 32.7*     Results from last 7 days   Lab Units 04/15/20  1300   SODIUM mmol/L 126*   POTASSIUM mmol/L 6.0*   CHLORIDE mmol/L 95*   CO2 mmol/L 23.0   BUN mg/dL 40*   CREATININE mg/dL 1.64*   GLUCOSE mg/dL 148*   CALCIUM mg/dL 8.8   ALT (SGPT) U/L 35   AST (SGOT) U/L 96*     Estimated Creatinine Clearance: 56.8 mL/min (A) (by C-G formula based on SCr of 1.64 mg/dL (H)).  Brief Urine Lab Results  (Last result in the past 365 days)      Color   Clarity   Blood   Leuk Est   Nitrite   Protein   CREAT   Urine HCG        02/23/20 1425 Dark Yellow Clear Negative Trace Positive Negative             Imaging Results (Last 24 Hours)     ** No results found for the last 24 hours. **             Assessment/Plan   Assessment & Plan     Active Hospital Problems    Diagnosis POA   • **Hyperkalemia [E87.5] Yes   • CKD (chronic kidney disease), stage III (CMS/HCC) [N18.3] Yes   • Bleeding esophageal varices (CMS/HCC) [I85.01] Unknown     Added automatically from request for surgery [id number]     •  Hyponatremia [E87.1] Yes   • Alcoholic cirrhosis of liver with ascites (CMS/HCC) [K70.31] Yes       Unruly Liang is a 54 y.o. male with a past medical history of alcoholic cirrhosis with associated ascites and esophageal varices and hepatorenal syndrome who was directly admitted from GI clinic for hyperkalemia.    Hyperkalemia  CKD  - Patient apparently has been eating a lot of potassium rich foods including bananas and oranges and a lot of fruits, will place on low potassium diet here  - Creatinine at baseline  - EKG sinus tachycardia with left axis deviation, no prior to compare to, but patient denies any significant chest pain  -Ordered insulin/glucose, albuterol, calcium gluconate, and Kayexalate  -Repeat CMP tomorrow    Alcoholic cirrhosis with ascites and esophageal varices  Hepatorenal syndrome  - We will plan for EGD tomorrow with Dr. Mills, GI consulted  - Plan for CT-guided paracentesis today  - Continue nadolol, rifaximin, and lactulose as needed for constipation    Chronic hyponatremia, stable  Sodium 126, was also low last month  -Continue to monitor CMP daily    DVT prophylaxis: Mechanical    CODE STATUS:    Code Status and Medical Interventions:   Ordered at: 04/15/20 1412     Limited Support to NOT Include:    Intubation     Level Of Support Discussed With:    Patient     Code Status:    No CPR     Medical Interventions (Level of Support Prior to Arrest):    Limited       Admission Status:  I believe this patient meets OBSERVATION status, however if further evaluation or treatment plans warrant, status may change.  Based upon current information, I predict patient's care encounter to be less than or equal to 2 midnights.      Electronically signed by Juanita Arvizu MD, 04/15/20, 2:16 PM.

## 2020-04-15 NOTE — CONSULTS
[facility] Gastroenterology Consult    Referring Provider: Brunner, Mark I, MD    PCP: Cathy Ware MD    Reason for Consultation: Decompensated liver cirrhosis   Chief complaint: Hyperkalemia     History of present illness:    Unruly Liang is a 54 y.o. male who is admitted with hyperkalemia.  He presented today for outpatient EGD for variceal banding and preoperative labs revealed hyperkalemia.   He is well known to our service from previous admission for decompensated alcohol liver cirrhosis with ascites, Grade 2 esophageal varices and type II hepatorenal syndrome.   He has been evaluated recently by [facility] transplant team with plans for future liver transplant.  He states his diet has recently consisted of a lot of citrus fruits (oranges, bananas, apples).   He is not on any diuretics.  He receives biweekly paracentesis and is scheduled for one today.       Allergies:  Patient has no known allergies.    Scheduled Meds:    albumin human 50 g Intravenous Weekly   midodrine 5 mg Oral TID AC   nadolol 20 mg Oral Daily   riFAXIMin 550 mg Oral Q12H   sodium chloride 10 mL Intravenous Q12H   sodium polystyrene 15 g Oral Once        Infusions:       PRN Meds:  •  albumin human  •  bisacodyl  •  famotidine  •  lactulose  •  sodium chloride    Home Meds:  Medications Prior to Admission   Medication Sig Dispense Refill Last Dose   • famotidine (PEPCID) 20 MG tablet Take 1 tablet by mouth At Night As Needed for Heartburn. 30 tablet 1 4/14/2020 at Unknown time   • lactulose (CHRONULAC) 10 GM/15ML solution Take 30 mL by mouth 3 (Three) Times a Day As Needed for constipation. Titrate for 2 bowel movements a day. 473 mL 1 4/14/2020 at Unknown time   • riFAXIMin (Xifaxan) 550 MG tablet Take 1 tablet by mouth Every 12 (Twelve) Hours. 60 tablet 11 4/14/2020 at Unknown time   • midodrine (PROAMATINE) 5 MG tablet Take 1 tablet by mouth 3 (Three) Times a Day. 90 tablet 1 Patient Taking Differently   • nadolol (CORGARD) 20 MG tablet  Take 1 tablet by mouth Daily. 30 tablet 5 4/3/2020 at Unknown time       ROS: Review of Systems   Constitutional: Positive for appetite change and fatigue.   HENT: Negative.    Eyes: Negative.    Respiratory: Positive for shortness of breath.    Cardiovascular: Negative.    Gastrointestinal: Positive for abdominal distention. Negative for abdominal pain.   Endocrine: Negative.    Genitourinary: Negative.    Musculoskeletal: Negative.    Skin: Negative.    Allergic/Immunologic: Negative.    Neurological: Negative.    Hematological: Negative.    Psychiatric/Behavioral: Negative.        PAST MED HX:  Past Medical History:   Diagnosis Date   • Anemia 01/10/2020    caused by detox   • Anxiety 10/01/2019    caused by drinking   • Ascites due to alcoholic cirrhosis (CMS/HCC)    • Cirrhosis (CMS/HCC)    • Clotting disorder (CMS/HCC) 10/01/2019    shave cuts bleeding uncontrolible   • Depression 10/01/2019   • Erectile dysfunction 01/10/2020   • GERD (gastroesophageal reflux disease) 1995    use Prilosec   • Headache 02/09/2020    detox and since then   • History of medical problems 01/10/2020    dizziness and balance issues   • Hypertension    • Liver disease 2015   • Liver disease    • Low back pain 01/10/2020    started with fall out of beach chair not set up right by wif   • Renal insufficiency 02/25/2020   • Urinary tract infection    • Visual impairment 01/10/2020    started with detox       PAST SURG HX:  Past Surgical History:   Procedure Laterality Date   • ENDOSCOPY N/A 2/27/2020    Procedure: ESOPHAGOGASTRODUODENOSCOPY;  Surgeon: Charly Lazo MD;  Location: [facility] ENDOSCOPY;  Service: Gastroenterology;  Laterality: N/A;   • PARACENTESIS     • SINUS SURGERY  1997    broken nose       FAM HX:  Family History   Problem Relation Age of Onset   • Alcohol abuse Brother         detox 2013   • Liver disease Brother    • Anxiety disorder Son         2013   • Anxiety disorder Son         2019   • Birth defects Son        "  2001 Club foot   • Birth defects Brother         born blind   • Cancer Father         pancreatic   • Heart disease Mother         heart attack fatal   • Heart attack Mother        SOC HX:  Social History     Socioeconomic History   • Marital status:      Spouse name: Not on file   • Number of children: Not on file   • Years of education: Not on file   • Highest education level: Not on file   Social Needs   • Financial resource strain: Not hard at all   • Food insecurity:     Worry: Never true     Inability: Not on file   • Transportation needs:     Medical: No     Non-medical: No   Tobacco Use   • Smoking status: Never Smoker   • Smokeless tobacco: Never Used   Substance and Sexual Activity   • Alcohol use: Not Currently     Comment: not drank since 2/2020   • Drug use: Never   • Sexual activity: Not Currently     Partners: Female       PHYSICAL EXAM  /84 (BP Location: Right arm, Patient Position: Lying)   Pulse 116   Temp 98.4 °F (36.9 °C) (Oral)   Resp 16   Ht 175.3 cm (69\")   Wt 88.9 kg (196 lb)   SpO2 99%   BMI 28.94 kg/m²   Wt Readings from Last 3 Encounters:   04/15/20 88.9 kg (196 lb)   04/03/20 86.5 kg (190 lb 12.8 oz)   03/20/20 82.1 kg (181 lb)   ,body mass index is 28.94 kg/m².  Physical Exam   Constitutional: He is oriented to person, place, and time. He appears well-developed and well-nourished.   HENT:   Head: Normocephalic and atraumatic.   Eyes: Scleral icterus is present.   Neck: Normal range of motion.   Cardiovascular: Normal rate and regular rhythm.   Pulmonary/Chest: Effort normal and breath sounds normal. No respiratory distress.   Abdominal: Soft. Bowel sounds are normal. He exhibits distension. There is no tenderness.   Large ascites    Musculoskeletal: Normal range of motion. He exhibits no edema.   Neurological: He is alert and oriented to person, place, and time.   Skin: Skin is warm and dry.   Jaundice    Psychiatric: He has a normal mood and affect. His behavior is " normal.       Results Review:   I reviewed the patient's new clinical results.    Lab Results   Component Value Date    WBC 18.64 (H) 03/17/2020    HGB 10.6 (L) 04/15/2020    HGB 11.6 (L) 03/17/2020    HGB 10.0 (L) 03/06/2020    HCT 32.7 (L) 04/15/2020    .7 (H) 03/17/2020     03/17/2020       Lab Results   Component Value Date    INR 1.84 (H) 03/17/2020    INR 1.80 (H) 03/06/2020    INR 2.12 (H) 02/27/2020       Lab Results   Component Value Date    GLUCOSE 148 (H) 04/15/2020    BUN 40 (H) 04/15/2020    CREATININE 1.64 (H) 04/15/2020    EGFRIFNONA 44 (L) 04/15/2020    BCR 24.4 04/15/2020     (L) 04/15/2020    K 6.0 (H) 04/15/2020    CO2 23.0 04/15/2020    CALCIUM 8.8 04/15/2020    ALBUMIN 2.80 (L) 04/15/2020    ALKPHOS 264 (H) 04/15/2020    BILITOT 5.0 (H) 04/15/2020    ALT 35 04/15/2020    AST 96 (H) 04/15/2020       ASSESSMENTS/PLANS    1. Hyperkalemia   2. Decompensated alcohol cirrhosis with ascites.  End stage.  MELD 26.     3. Type II Hepatorenal syndrome  4. Grade I hepatic encephalopathy.  Managed on Lactulose and Xifaxan  5. Grade II Esophageal varices     Suspect hyperkalemia is in part diet related in the setting of hepatorenal syndrome.       >>> Admit to to the hospital for observation and correction of hyperkalemia  >>> Low potassium diet.  Will have dietitian see.    >>> CT paracentesis today as scheduled outpatient.  Give 50 g IV Albumin.  >>> EGD tomorrow for variceal banding if K+ improved   >>> Continue Xifaxan and Lactulose (home doses)     Anticipate discharge tomorrow following EGD if improved.     I discussed the patient's findings and my recommendations with patient    JACKY Richardson  04/15/20  14:16

## 2020-04-15 NOTE — PLAN OF CARE
Problem: Patient Care Overview  Goal: Plan of Care Review  Outcome: Ongoing (interventions implemented as appropriate)  Flowsheets (Taken 4/15/2020 4461)  Plan of Care Reviewed With: patient  Note:   Patient arrived to floor around 4pm. Patient has had no complaints throughout shift. Albumin to be replaced - requested from pharmacy - not on floor yet. Patient up ad enrike. Continuing to monitor.      Problem: Patient Care Overview  Goal: Individualization and Mutuality  Outcome: Ongoing (interventions implemented as appropriate)

## 2020-04-15 NOTE — NURSING NOTE
Patient placed on cardiac monitors, vital signs stable. Images taken and reviewed by Dr. Marie. Paracentesis performed a total volume of 8700 mL fluid was removed from peritoneum with samples sent to lab for testing. Patient tolerated well, vitals remained stable throughout procedure. Report called to RN, patient is being transferred to , and patient returned to unit via stretcher.

## 2020-04-16 ENCOUNTER — ANESTHESIA EVENT (OUTPATIENT)
Dept: GASTROENTEROLOGY | Facility: HOSPITAL | Age: 55
End: 2020-04-16

## 2020-04-16 ENCOUNTER — HOSPITAL ENCOUNTER (OUTPATIENT)
Dept: CT IMAGING | Facility: HOSPITAL | Age: 55
End: 2020-04-16

## 2020-04-16 ENCOUNTER — ANESTHESIA (OUTPATIENT)
Dept: GASTROENTEROLOGY | Facility: HOSPITAL | Age: 55
End: 2020-04-16

## 2020-04-16 VITALS
BODY MASS INDEX: 32.58 KG/M2 | HEART RATE: 73 BPM | RESPIRATION RATE: 18 BRPM | TEMPERATURE: 97.3 F | DIASTOLIC BLOOD PRESSURE: 66 MMHG | OXYGEN SATURATION: 100 % | HEIGHT: 69 IN | WEIGHT: 220 LBS | SYSTOLIC BLOOD PRESSURE: 101 MMHG

## 2020-04-16 LAB
ALBUMIN SERPL-MCNC: 3.1 G/DL (ref 3.5–5.2)
ALBUMIN/GLOB SERPL: 1.1 G/DL
ALP SERPL-CCNC: 209 U/L (ref 39–117)
ALT SERPL W P-5'-P-CCNC: 26 U/L (ref 1–41)
ANION GAP SERPL CALCULATED.3IONS-SCNC: 7 MMOL/L (ref 5–15)
AST SERPL-CCNC: 70 U/L (ref 1–40)
BILIRUB SERPL-MCNC: 4.2 MG/DL (ref 0.2–1.2)
BUN BLD-MCNC: 43 MG/DL (ref 6–20)
BUN/CREAT SERPL: 26.4 (ref 7–25)
CALCIUM SPEC-SCNC: 8.7 MG/DL (ref 8.6–10.5)
CHLORIDE SERPL-SCNC: 95 MMOL/L (ref 98–107)
CO2 SERPL-SCNC: 22 MMOL/L (ref 22–29)
CREAT BLD-MCNC: 1.63 MG/DL (ref 0.76–1.27)
DEPRECATED RDW RBC AUTO: 55.8 FL (ref 37–54)
ERYTHROCYTE [DISTWIDTH] IN BLOOD BY AUTOMATED COUNT: 14.1 % (ref 12.3–15.4)
GFR SERPL CREATININE-BSD FRML MDRD: 44 ML/MIN/1.73
GLOBULIN UR ELPH-MCNC: 2.8 GM/DL
GLUCOSE BLD-MCNC: 95 MG/DL (ref 65–99)
HCT VFR BLD AUTO: 26.1 % (ref 37.5–51)
HGB BLD-MCNC: 8.8 G/DL (ref 13–17.7)
INR PPP: 1.93 (ref 0.85–1.16)
MCH RBC QN AUTO: 36.7 PG (ref 26.6–33)
MCHC RBC AUTO-ENTMCNC: 33.7 G/DL (ref 31.5–35.7)
MCV RBC AUTO: 108.8 FL (ref 79–97)
PLATELET # BLD AUTO: 125 10*3/MM3 (ref 140–450)
PMV BLD AUTO: 10 FL (ref 6–12)
POTASSIUM BLD-SCNC: 5.4 MMOL/L (ref 3.5–5.2)
PROT SERPL-MCNC: 5.9 G/DL (ref 6–8.5)
PROTHROMBIN TIME: 21.7 SECONDS (ref 11.5–14)
RBC # BLD AUTO: 2.4 10*6/MM3 (ref 4.14–5.8)
SODIUM BLD-SCNC: 124 MMOL/L (ref 136–145)
WBC NRBC COR # BLD: 14.04 10*3/MM3 (ref 3.4–10.8)

## 2020-04-16 PROCEDURE — G0378 HOSPITAL OBSERVATION PER HR: HCPCS

## 2020-04-16 PROCEDURE — 43235 EGD DIAGNOSTIC BRUSH WASH: CPT | Performed by: INTERNAL MEDICINE

## 2020-04-16 PROCEDURE — 80053 COMPREHEN METABOLIC PANEL: CPT | Performed by: INTERNAL MEDICINE

## 2020-04-16 PROCEDURE — 85027 COMPLETE CBC AUTOMATED: CPT | Performed by: INTERNAL MEDICINE

## 2020-04-16 PROCEDURE — 93010 ELECTROCARDIOGRAM REPORT: CPT | Performed by: INTERNAL MEDICINE

## 2020-04-16 PROCEDURE — 85610 PROTHROMBIN TIME: CPT | Performed by: INTERNAL MEDICINE

## 2020-04-16 PROCEDURE — 63710000001 INSULIN REGULAR HUMAN PER 5 UNITS: Performed by: INTERNAL MEDICINE

## 2020-04-16 PROCEDURE — 25010000002 PROPOFOL 10 MG/ML EMULSION: Performed by: NURSE ANESTHETIST, CERTIFIED REGISTERED

## 2020-04-16 PROCEDURE — 93005 ELECTROCARDIOGRAM TRACING: CPT | Performed by: ANESTHESIOLOGY

## 2020-04-16 PROCEDURE — 99217 PR OBSERVATION CARE DISCHARGE MANAGEMENT: CPT | Performed by: INTERNAL MEDICINE

## 2020-04-16 PROCEDURE — 96376 TX/PRO/DX INJ SAME DRUG ADON: CPT

## 2020-04-16 RX ORDER — IPRATROPIUM BROMIDE AND ALBUTEROL SULFATE 2.5; .5 MG/3ML; MG/3ML
3 SOLUTION RESPIRATORY (INHALATION) ONCE AS NEEDED
Status: DISCONTINUED | OUTPATIENT
Start: 2020-04-16 | End: 2020-04-16 | Stop reason: HOSPADM

## 2020-04-16 RX ORDER — HYDRALAZINE HYDROCHLORIDE 20 MG/ML
5 INJECTION INTRAMUSCULAR; INTRAVENOUS
Status: DISCONTINUED | OUTPATIENT
Start: 2020-04-16 | End: 2020-04-16 | Stop reason: HOSPADM

## 2020-04-16 RX ORDER — SODIUM CHLORIDE 9 MG/ML
50 INJECTION, SOLUTION INTRAVENOUS CONTINUOUS
Status: DISCONTINUED | OUTPATIENT
Start: 2020-04-16 | End: 2020-04-16 | Stop reason: HOSPADM

## 2020-04-16 RX ORDER — SODIUM CHLORIDE, SODIUM LACTATE, POTASSIUM CHLORIDE, CALCIUM CHLORIDE 600; 310; 30; 20 MG/100ML; MG/100ML; MG/100ML; MG/100ML
9 INJECTION, SOLUTION INTRAVENOUS CONTINUOUS
Status: DISCONTINUED | OUTPATIENT
Start: 2020-04-16 | End: 2020-04-16 | Stop reason: HOSPADM

## 2020-04-16 RX ORDER — PROPOFOL 10 MG/ML
VIAL (ML) INTRAVENOUS AS NEEDED
Status: DISCONTINUED | OUTPATIENT
Start: 2020-04-16 | End: 2020-04-16 | Stop reason: SURG

## 2020-04-16 RX ORDER — FAMOTIDINE 20 MG/1
20 TABLET, FILM COATED ORAL ONCE
Status: DISCONTINUED | OUTPATIENT
Start: 2020-04-16 | End: 2020-04-16 | Stop reason: HOSPADM

## 2020-04-16 RX ORDER — SODIUM CHLORIDE 0.9 % (FLUSH) 0.9 %
10 SYRINGE (ML) INJECTION EVERY 12 HOURS SCHEDULED
Status: DISCONTINUED | OUTPATIENT
Start: 2020-04-16 | End: 2020-04-16 | Stop reason: HOSPADM

## 2020-04-16 RX ORDER — PROMETHAZINE HYDROCHLORIDE 25 MG/1
25 SUPPOSITORY RECTAL ONCE AS NEEDED
Status: DISCONTINUED | OUTPATIENT
Start: 2020-04-16 | End: 2020-04-16 | Stop reason: HOSPADM

## 2020-04-16 RX ORDER — PROMETHAZINE HYDROCHLORIDE 25 MG/ML
6.25 INJECTION, SOLUTION INTRAMUSCULAR; INTRAVENOUS ONCE AS NEEDED
Status: DISCONTINUED | OUTPATIENT
Start: 2020-04-16 | End: 2020-04-16 | Stop reason: HOSPADM

## 2020-04-16 RX ORDER — LABETALOL HYDROCHLORIDE 5 MG/ML
5 INJECTION, SOLUTION INTRAVENOUS
Status: DISCONTINUED | OUTPATIENT
Start: 2020-04-16 | End: 2020-04-16 | Stop reason: HOSPADM

## 2020-04-16 RX ORDER — DEXTROSE MONOHYDRATE 25 G/50ML
50 INJECTION, SOLUTION INTRAVENOUS ONCE
Status: COMPLETED | OUTPATIENT
Start: 2020-04-16 | End: 2020-04-16

## 2020-04-16 RX ORDER — LIDOCAINE HYDROCHLORIDE 10 MG/ML
INJECTION, SOLUTION EPIDURAL; INFILTRATION; INTRACAUDAL; PERINEURAL AS NEEDED
Status: DISCONTINUED | OUTPATIENT
Start: 2020-04-16 | End: 2020-04-16 | Stop reason: SURG

## 2020-04-16 RX ORDER — ONDANSETRON 2 MG/ML
4 INJECTION INTRAMUSCULAR; INTRAVENOUS ONCE AS NEEDED
Status: DISCONTINUED | OUTPATIENT
Start: 2020-04-16 | End: 2020-04-16 | Stop reason: HOSPADM

## 2020-04-16 RX ORDER — PROMETHAZINE HYDROCHLORIDE 25 MG/1
25 TABLET ORAL ONCE AS NEEDED
Status: DISCONTINUED | OUTPATIENT
Start: 2020-04-16 | End: 2020-04-16 | Stop reason: HOSPADM

## 2020-04-16 RX ORDER — FAMOTIDINE 10 MG/ML
20 INJECTION, SOLUTION INTRAVENOUS ONCE
Status: DISCONTINUED | OUTPATIENT
Start: 2020-04-16 | End: 2020-04-16 | Stop reason: HOSPADM

## 2020-04-16 RX ORDER — SODIUM CHLORIDE 0.9 % (FLUSH) 0.9 %
10 SYRINGE (ML) INJECTION AS NEEDED
Status: DISCONTINUED | OUTPATIENT
Start: 2020-04-16 | End: 2020-04-16 | Stop reason: HOSPADM

## 2020-04-16 RX ORDER — LIDOCAINE HYDROCHLORIDE 10 MG/ML
0.5 INJECTION, SOLUTION EPIDURAL; INFILTRATION; INTRACAUDAL; PERINEURAL ONCE AS NEEDED
Status: DISCONTINUED | OUTPATIENT
Start: 2020-04-16 | End: 2020-04-16 | Stop reason: HOSPADM

## 2020-04-16 RX ADMIN — MIDODRINE HYDROCHLORIDE 5 MG: 5 TABLET ORAL at 09:32

## 2020-04-16 RX ADMIN — INSULIN HUMAN 10 UNITS: 100 INJECTION, SOLUTION PARENTERAL at 09:52

## 2020-04-16 RX ADMIN — PROPOFOL 80 MG: 10 INJECTION, EMULSION INTRAVENOUS at 16:23

## 2020-04-16 RX ADMIN — SODIUM CHLORIDE, PRESERVATIVE FREE 10 ML: 5 INJECTION INTRAVENOUS at 10:00

## 2020-04-16 RX ADMIN — NADOLOL 20 MG: 20 TABLET ORAL at 11:41

## 2020-04-16 RX ADMIN — SODIUM CHLORIDE 50 ML/HR: 9 INJECTION, SOLUTION INTRAVENOUS at 15:14

## 2020-04-16 RX ADMIN — LIDOCAINE HYDROCHLORIDE 100 MG: 10 INJECTION, SOLUTION EPIDURAL; INFILTRATION; INTRACAUDAL; PERINEURAL at 16:23

## 2020-04-16 RX ADMIN — RIFAXIMIN 550 MG: 550 TABLET ORAL at 11:41

## 2020-04-16 RX ADMIN — DEXTROSE MONOHYDRATE 50 ML: 25 INJECTION, SOLUTION INTRAVENOUS at 09:52

## 2020-04-16 NOTE — ANESTHESIA POSTPROCEDURE EVALUATION
Patient: Unruly Liang    Procedure Summary     Date:  04/16/20 Room / Location:   [facility]    Anesthesia Start:  1616 Anesthesia Stop:  1634    Procedure:  ESOPHAGOGASTRODUODENOSCOPY (N/A ) Diagnosis:       Alcoholic cirrhosis of liver with ascites (CMS/HCC)      (Alcoholic cirrhosis of liver with ascites (CMS/HCC) [K70.31])    Surgeon:  Santiago Estevez MD Provider:  Unruly Licea MD    Anesthesia Type:  general ASA Status:  3          Anesthesia Type: general    Vitals  No vitals data found for the desired time range.    BP 92/56  SpO2 98% RA  HR 68  RR 12  Temp 97.8F    Post Anesthesia Care and Evaluation    Patient location during evaluation: PACU  Patient participation: complete - patient participated  Level of consciousness: awake and alert  Pain score: 0  Pain management: adequate  Airway patency: patent  Anesthetic complications: No anesthetic complications  PONV Status: none  Cardiovascular status: hemodynamically stable and acceptable  Respiratory status: nonlabored ventilation, acceptable and room air  Hydration status: acceptable

## 2020-04-16 NOTE — DISCHARGE SUMMARY
[facility] Medicine Services  DISCHARGE SUMMARY    Patient Name: Unruly Liang  : 1965  MRN: [id number]    Date of Admission: 4/15/2020  7:30 AM  Date of Discharge:  2020  Primary Care Physician: Cathy Ware MD    Consults     Date and Time Order Name Status Description    4/15/2020 1412 Inpatient Gastroenterology Consult Completed           Hospital Course     Presenting Problem:   Bleeding esophageal varices, unspecified esophageal varices type (CMS/HCC) [I85.01]  Alcoholic cirrhosis of liver with ascites (CMS/HCC) [K70.31]  Hyperkalemia [E87.5]    Active Hospital Problems    Diagnosis  POA   • **Hyperkalemia [E87.5]  Yes   • CKD (chronic kidney disease), stage III (CMS/HCC) [N18.3]  Yes   • Bleeding esophageal varices (CMS/HCC) [I85.01]  Unknown   • Hyponatremia [E87.1]  Yes   • Alcoholic cirrhosis of liver with ascites (CMS/HCC) [K70.31]  Yes      Resolved Hospital Problems   No resolved problems to display.          Hospital Course:  Unruly Liang is a 54 y.o. male with a past medical history of alcoholic cirrhosis with associated ascites and esophageal varices and hepatorenal syndrome who was directly admitted from GI clinic for hyperkalemia.     Hyperkalemia  CKD  - Patient apparently has been eating a lot of potassium rich foods including bananas and oranges and a lot of fruits, we have advised him on a low K+ diet  - Creatinine at baseline  - EKG sinus tachycardia with left axis deviation, no prior to compare to, but patient denies any significant chest pain. No peaked T waves  -Ordered insulin/glucose for this am given repeat K+ still high  --improved     Alcoholic cirrhosis with ascites and esophageal varices  Hepatorenal syndrome  - EGD today at 3pm with Dr. Estevez, GI consulted  - s/p LVP on day of admission, cx NGTD  - Continue rifaximin, and lactulose (titrate for 3-4 BMs daily)  --EGD revealed only grade I EV and no banding was done/required.  Pt  unable to tolerate Nadolol in the past, so will d/c this upon discharge.      Chronic hyponatremia, stable       Anemia  --EGD did not reveal any active bleeding and only showed grade I EV     Leukocytosis  --unclear etiology but appears to have been present on last lab check as outpatient. No fever or other signs of infection.      TCP  --new, but likely r/t above, monitor as outpatient         Discharge Follow Up Recommendations for outpatient labs/diagnostics:  1. Follow up with PCP in one week  2. F/U with Dr. Estevez as per his recs  3. F/u with [facility] transplant clinic    Day of Discharge     HPI:     Doing well this am, just wants to eat. Had 8L removed with LVP last evening, feels better.      Review of Systems  Gen- No fevers, chills  CV- No chest pain, palpitations  Resp- No cough, dyspnea  GI- No N/V/D, abd pain       Vital Signs:   Temp:  [96.8 °F (36 °C)-98.9 °F (37.2 °C)] 97.3 °F (36.3 °C)  Heart Rate:  [] 71  Resp:  [14-18] 18  BP: ()/(51-74) 109/69     Physical Exam:  Constitutional: No acute distress, awake, alert  HENT: NCAT, mucous membranes moist  Respiratory: Clear to auscultation bilaterally, respiratory effort normal   Cardiovascular: RRR, no murmurs, rubs, or gallops, palpable pedal pulses bilaterally  Gastrointestinal: Positive bowel sounds, soft, nontender, nondistended  Musculoskeletal: No bilateral ankle edema  Psychiatric: Appropriate affect, cooperative  Neurologic: Oriented x 3, strength symmetric in all extremities, Cranial Nerves grossly intact to confrontation, speech clear  Skin: No rashes     Pertinent  and/or Most Recent Results     Results from last 7 days   Lab Units 04/16/20  0534 04/16/20  0533 04/15/20  2057 04/15/20  1300 04/15/20  0808 04/15/20  0805   WBC 10*3/mm3 14.04*  --   --   --   --   --    HEMOGLOBIN g/dL 8.8*  --   --   --  10.6*  --    HEMATOCRIT % 26.1*  --   --   --  32.7*  --    PLATELETS 10*3/mm3 125*  --   --   --   --   --    SODIUM mmol/L  --   124*  --  126*  --  125*   POTASSIUM mmol/L  --  5.4* 5.5* 6.0*  --  6.3*  6.3*   CHLORIDE mmol/L  --  95*  --  95*  --  96*   CO2 mmol/L  --  22.0  --  23.0  --  21.0*   BUN mg/dL  --  43*  --  40*  --  46*   CREATININE mg/dL  --  1.63*  --  1.64*  --  1.62*   GLUCOSE mg/dL  --  95  --  148*  --  107*   CALCIUM mg/dL  --  8.7  --  8.8  --  8.9     Results from last 7 days   Lab Units 04/16/20  0533 04/15/20  1300   BILIRUBIN mg/dL 4.2* 5.0*   ALK PHOS U/L 209* 264*   ALT (SGPT) U/L 26 35   AST (SGOT) U/L 70* 96*   PROTIME Seconds 21.7*  --    INR  1.93*  --            Invalid input(s): TG, LDLCALC, LDLREALC        Brief Urine Lab Results  (Last result in the past 365 days)      Color   Clarity   Blood   Leuk Est   Nitrite   Protein   CREAT   Urine HCG        02/23/20 1425 Dark Yellow Clear Negative Trace Positive Negative               Microbiology Results Abnormal     Procedure Component Value - Date/Time    Body Fluid Culture - Body Fluid, Peritoneum [[id number]] Collected:  04/15/20 1456    Lab Status:  Preliminary result Specimen:  Body Fluid from Peritoneum Updated:  04/16/20 0946     Body Fluid Culture No growth     Gram Stain Few (2+) WBCs seen      No organisms seen          Imaging Results (All)     Procedure Component Value Units Date/Time    CT Guided Paracentesis [[id number]] Collected:  04/15/20 2026     Updated:  04/15/20 2030    Narrative:       PROCEDURE: CT-guided paracentesis     Procedural Personnel  Attending physician(s): ZEYNEP Marie M.D.  Fellow physician(s): None  Resident physician(s): None  Advanced practice provider(s): None     Pre-procedure diagnosis: Decompensated cirrhosis. Recurrent large volume  symptomatic ascites  Post-procedure diagnosis: Same  Indication: Therapeutic  Additional clinical history: None     Complications: No immediate complications.       Impression:          CT-guided paracentesis with drainage of 8700 mL of cloudy yellow fluid.     Plan:      Resume care by  clinical team.  _______________________________________________________________     PROCEDURE SUMMARY:  - Limited CT  - CT-guided paracentesis  - Additional procedure(s): None     PROCEDURE DETAILS:     Pre-procedure  Consent: Informed consent for the procedure including risks, benefits  and alternatives was obtained and time-out was performed prior to the  procedure.  Preparation: The site was prepared and draped using maximal sterile  barrier technique including cutaneous antisepsis.     Anesthesia/sedation  Level of anesthesia/sedation: No sedation  Anesthesia/sedation administered by: Not applicable  Total intra-service sedation time (minutes): Not applicable     Initial abdominal CT  Initial abdominal CT was performed.  Findings: Large volume ascites. A safe window for paracentesis was  identified.     Paracentesis  Local anesthesia was administered. The peritoneal cavity was accessed  and needle position confirmed by CT. Ascites was drained. The catheter  was then removed, and a sterile bandage was applied.  Paracentesis access technique: CT-guided  Catheter placed: 8.5 English  Post-drainage CT: Not performed     Radiation Dose  CT dose length product (mGy-cm): 581      Additional Details  Additional description of procedure: None  Equipment details: None  Specimens removed: Abdominal fluid  Estimated blood loss (mL): Less than 10  Standardized report: Paracentesis     Attestation  I was present and scrubbed for entire procedure. Imaging reviewed. Agree  with the report as written.        This report was finalized on 4/15/2020 8:27 PM by Ajit Marie.                            Plan for Follow-up of Pending Labs/Results:    Order Current Status    Body Fluid Culture - Body Fluid, Peritoneum Preliminary result        Discharge Details        Discharge Medications      Continue These Medications      Instructions Start Date   famotidine 20 MG tablet  Commonly known as:  Pepcid   20 mg, Oral, Nightly PRN         lactulose 10 GM/15ML solution  Commonly known as:  CHRONULAC   20 g, Oral, 3 Times Daily PRN      midodrine 5 MG tablet  Commonly known as:  PROAMATINE   5 mg, Oral, 3 Times Daily      riFAXIMin 550 MG tablet  Commonly known as:  Xifaxan   550 mg, Oral, Every 12 Hours Scheduled         Stop These Medications    nadolol 20 MG tablet  Commonly known as:  CORGARD            No Known Allergies      Discharge Disposition:  Home or Self Care    Diet:  Hospital:  Diet Order   Procedures   • NPO Diet       Activity:      Restrictions or Other Recommendations:         CODE STATUS:    Code Status and Medical Interventions:   Ordered at: 04/15/20 1412     Limited Support to NOT Include:    Intubation     Level Of Support Discussed With:    Patient     Code Status:    No CPR     Medical Interventions (Level of Support Prior to Arrest):    Limited       Future Appointments   Date Time Provider Department Center   6/24/2020  3:15 PM Cathy Ware MD [facility] None       Additional Instructions for the Follow-ups that You Need to Schedule     Discharge Follow-up with PCP   As directed       Currently Documented PCP:    Cathy Ware MD    PCP Phone Number:    [phone]     Follow Up Details:  in one week with PCP               Time Spent on Discharge:  35 minutes    Electronically signed by Candy Ojeda MD, 04/16/20, 4:54 PM.

## 2020-04-16 NOTE — PROGRESS NOTES
Discharge Planning Assessment  [facility]     Patient Name: Unruly Liang  MRN: [id number]  Today's Date: 4/16/2020    Admit Date: 4/15/2020    Discharge Needs Assessment     Row Name 04/16/20 1433       Living Environment    Lives With  spouse    Name(s) of Who Lives With Patient  spouse is Navya    Current Living Arrangements  home/apartment/condo    Primary Care Provided by  self    Provides Primary Care For  no one, unable/limited ability to care for self    Family Caregiver if Needed  spouse    Quality of Family Relationships  supportive    Able to Return to Prior Arrangements  yes       Resource/Environmental Concerns    Resource/Environmental Concerns  none    Transportation Concerns  car, none       Transition Planning    Patient/Family Anticipates Transition to  home with family    Patient/Family Anticipated Services at Transition  none    Transportation Anticipated  family or friend will provide       Discharge Needs Assessment    Readmission Within the Last 30 Days  no previous admission in last 30 days    Concerns to be Addressed  no discharge needs identified    Equipment Currently Used at Home  cane, straight    Anticipated Changes Related to Illness  none    Equipment Needed After Discharge  none        Discharge Plan     Row Name 04/16/20 1434       Plan    Plan  Home with spouse    Plan Comments  I have spoken with patient this afternoon regarding discharge planning. He is a  54 year old man from [place] who is independent with ADL's. He will have spouses assistance at home and she will provide transportation home.     Final Discharge Disposition Code  01 - home or self-care        Destination      Coordination has not been started for this encounter.      Durable Medical Equipment      Coordination has not been started for this encounter.      Dialysis/Infusion      Coordination has not been started for this encounter.      Home Medical Care      Coordination has not been started for this  encounter.      Therapy      Coordination has not been started for this encounter.      Community Resources      Coordination has not been started for this encounter.          Demographic Summary     Row Name 04/16/20 1428       General Information    Referral Source  admission list    Preferred Language  English    General Information Comments  PCP is Cathy Ware       Contact Information    Permission Granted to Share Info With      Contact Information Comments  [phone]        Functional Status     Row Name 04/16/20 1432       Functional Status    Usual Activity Tolerance  good    Current Activity Tolerance  good       Functional Status, IADL    Medications  independent    Meal Preparation  independent    Housekeeping  independent    Laundry  independent    Shopping  independent       Employment/    Employment/ Comments  Patient has  [place] [employer] insurance and denies concerns regarding coverage or disruption in coverage issues. Patient has drug coverage and denies issues obtaining or affording current medications.         Psychosocial    No documentation.       Abuse/Neglect    No documentation.       Legal    No documentation.       Substance Abuse    No documentation.       Patient Forms    No documentation.           Ellen Zhou RN

## 2020-04-16 NOTE — POST-PROCEDURE NOTE
Interventional Radiology Operative Note    Date: 04/15/20     Time: 20:25     Pre-op Diagnosis: Decompensated cirrhosis - symptomatic ascites  Post-op Diagnosis: Same     Procedure: CT guided paracentesis     Surgeon: ZEYNEP Marie M.D.  Assistants: None     Sedation: None     Estimated Blood Loss (EBL): Trace      Urine Output (UOP): N/A (short procedure)     IVF: N/A (short procedure)     Findings: Large volume ascites     Specimens: 8.7 liters clear serous fluid     Complications: No Immediate     Disposition: Recovery room. Stable.

## 2020-04-16 NOTE — ANESTHESIA PREPROCEDURE EVALUATION
Anesthesia Evaluation                  Airway   Mallampati: I  TM distance: >3 FB  Neck ROM: full  No difficulty expected  Dental      Pulmonary    Cardiovascular     ECG reviewed    (+) hypertension,       Neuro/Psych  (+) headaches,     GI/Hepatic/Renal/Endo    (+)  GERD,  liver disease, renal disease,     Musculoskeletal     Abdominal    Substance History   (+) alcohol use,      OB/GYN          Other                        Anesthesia Plan    ASA 3     general     intravenous induction     Anesthetic plan, all risks, benefits, and alternatives have been provided, discussed and informed consent has been obtained with: patient.    Plan discussed with CRNA.

## 2020-04-16 NOTE — PROGRESS NOTES
[facility] Medicine Services  PROGRESS NOTE    Patient Name: Unruly Liang  : 1965  MRN: [id number]    Date of Admission: 4/15/2020  Primary Care Physician: Cathy Ware MD    Subjective   Subjective     CC:  hyperkalemia    HPI:  Doing well this am, just wants to eat. Had 8L removed with LVP last evening, feels better.     Review of Systems  Gen- No fevers, chills  CV- No chest pain, palpitations  Resp- No cough, dyspnea  GI- No N/V/D, abd pain      Objective   Objective     Vital Signs:   Temp:  [97.9 °F (36.6 °C)-98.9 °F (37.2 °C)] 98.5 °F (36.9 °C)  Heart Rate:  [] 86  Resp:  [16-18] 18  BP: ()/(52-90) 97/52        Physical Exam:  Constitutional: No acute distress, awake, alert  HENT: NCAT, mucous membranes moist  Respiratory: Clear to auscultation bilaterally, respiratory effort normal   Cardiovascular: RRR, no murmurs, rubs, or gallops, palpable pedal pulses bilaterally  Gastrointestinal: Positive bowel sounds, soft, nontender, nondistended  Musculoskeletal: No bilateral ankle edema  Psychiatric: Appropriate affect, cooperative  Neurologic: Oriented x 3, strength symmetric in all extremities, Cranial Nerves grossly intact to confrontation, speech clear  Skin: No rashes    Results Reviewed:  Results from last 7 days   Lab Units 20  0534 20  0533 04/15/20  0808   WBC 10*3/mm3 14.04*  --   --    HEMOGLOBIN g/dL 8.8*  --  10.6*   HEMATOCRIT % 26.1*  --  32.7*   PLATELETS 10*3/mm3 125*  --   --    INR   --  1.93*  --      Results from last 7 days   Lab Units 20  0533 04/15/20  2057 04/15/20  1300 04/15/20  0805   SODIUM mmol/L 124*  --  126* 125*   POTASSIUM mmol/L 5.4* 5.5* 6.0* 6.3*  6.3*   CHLORIDE mmol/L 95*  --  95* 96*   CO2 mmol/L 22.0  --  23.0 21.0*   BUN mg/dL 43*  --  40* 46*   CREATININE mg/dL 1.63*  --  1.64* 1.62*   GLUCOSE mg/dL 95  --  148* 107*   CALCIUM mg/dL 8.7  --  8.8 8.9   ALT (SGPT) U/L 26  --  35  --    AST (SGOT) U/L  70*  --  96*  --      Estimated Creatinine Clearance: 57.2 mL/min (A) (by C-G formula based on SCr of 1.63 mg/dL (H)).    Microbiology Results Abnormal     Procedure Component Value - Date/Time    Body Fluid Culture - Body Fluid, Peritoneum [[id number]] Collected:  04/15/20 1456    Lab Status:  Preliminary result Specimen:  Body Fluid from Peritoneum Updated:  04/15/20 2014     Gram Stain Few (2+) WBCs seen      No organisms seen          Imaging Results (Last 24 Hours)     Procedure Component Value Units Date/Time    CT Guided Paracentesis [[id number]] Collected:  04/15/20 2026     Updated:  04/15/20 2030    Narrative:       PROCEDURE: CT-guided paracentesis     Procedural Personnel  Attending physician(s): ZEYNEP Marie M.D.  Fellow physician(s): None  Resident physician(s): None  Advanced practice provider(s): None     Pre-procedure diagnosis: Decompensated cirrhosis. Recurrent large volume  symptomatic ascites  Post-procedure diagnosis: Same  Indication: Therapeutic  Additional clinical history: None     Complications: No immediate complications.       Impression:          CT-guided paracentesis with drainage of 8700 mL of cloudy yellow fluid.     Plan:      Resume care by clinical team.  _______________________________________________________________     PROCEDURE SUMMARY:  - Limited CT  - CT-guided paracentesis  - Additional procedure(s): None     PROCEDURE DETAILS:     Pre-procedure  Consent: Informed consent for the procedure including risks, benefits  and alternatives was obtained and time-out was performed prior to the  procedure.  Preparation: The site was prepared and draped using maximal sterile  barrier technique including cutaneous antisepsis.     Anesthesia/sedation  Level of anesthesia/sedation: No sedation  Anesthesia/sedation administered by: Not applicable  Total intra-service sedation time (minutes): Not applicable     Initial abdominal CT  Initial abdominal CT was performed.  Findings: Large volume  ascites. A safe window for paracentesis was  identified.     Paracentesis  Local anesthesia was administered. The peritoneal cavity was accessed  and needle position confirmed by CT. Ascites was drained. The catheter  was then removed, and a sterile bandage was applied.  Paracentesis access technique: CT-guided  Catheter placed: 8.5 [place]  Post-drainage CT: Not performed     Radiation Dose  CT dose length product (mGy-cm): 581      Additional Details  Additional description of procedure: None  Equipment details: None  Specimens removed: Abdominal fluid  Estimated blood loss (mL): Less than 10  Standardized report: Paracentesis     Attestation  I was present and scrubbed for entire procedure. Imaging reviewed. Agree  with the report as written.        This report was finalized on 4/15/2020 8:27 PM by Ajit Marie.                  I have reviewed the medications:  Scheduled Meds:  albumin human 50 g Intravenous Weekly   dextrose 50 mL Intravenous Once   And      insulin regular 10 Units Intravenous Once   famotidine 20 mg Intravenous Once   famotidine 20 mg Oral Once   midodrine 5 mg Oral TID AC   nadolol 20 mg Oral Daily   riFAXIMin 550 mg Oral Q12H   sodium chloride 10 mL Intravenous Q12H   sodium chloride 10 mL Intravenous Q12H     Continuous Infusions:  lactated ringers 9 mL/hr     PRN Meds:.•  albumin human  •  bisacodyl  •  famotidine  •  lactulose  •  lidocaine PF 1%  •  sodium chloride  •  sodium chloride    Assessment/Plan   Assessment & Plan     Active Hospital Problems    Diagnosis  POA   • **Hyperkalemia [E87.5]  Yes   • CKD (chronic kidney disease), stage III (CMS/HCC) [N18.3]  Yes   • Bleeding esophageal varices (CMS/HCC) [I85.01]  Unknown   • Hyponatremia [E87.1]  Yes   • Alcoholic cirrhosis of liver with ascites (CMS/HCC) [K70.31]  Yes      Resolved Hospital Problems   No resolved problems to display.        Brief Hospital Course to date:  Unruly Liang is a 54 y.o. male with a past medical  history of alcoholic cirrhosis with associated ascites and esophageal varices and hepatorenal syndrome who was directly admitted from GI clinic for hyperkalemia.     Hyperkalemia  CKD  - Patient apparently has been eating a lot of potassium rich foods including bananas and oranges and a lot of fruits, continue low potassium diet here  - Creatinine at baseline  - EKG sinus tachycardia with left axis deviation, no prior to compare to, but patient denies any significant chest pain. No peaked T waves  -Ordered insulin/glucose for this am given repeat K+ still high  -Repeat CMP tomorrow     Alcoholic cirrhosis with ascites and esophageal varices  Hepatorenal syndrome  - EGD today at 3pm with Dr. Estevez, GI consulted  - s/p LVP on day of admission, cx NGTD  - Continue nadolol, rifaximin, and lactulose (titrate for 3-4 BMs daily)     Chronic hyponatremia, stable  -Continue to monitor CMP daily    Anemia  --likely related to EV/ GI loss  --plan for EGD with banding today  --repeat CBC in am    Leukocytosis  --unclear etiology but appears to have been present on last lab check as outpatient. No fever or other signs of infection.  Will monitor for now    TCP  --new, but likely r/t above, monitor      DVT Prophylaxis:  mechanical    Disposition: I expect the patient to be discharged home 1-2 days    CODE STATUS:   Code Status and Medical Interventions:   Ordered at: 04/15/20 1412     Limited Support to NOT Include:    Intubation     Level Of Support Discussed With:    Patient     Code Status:    No CPR     Medical Interventions (Level of Support Prior to Arrest):    Limited         Electronically signed by Candy Ojeda MD, 04/16/20, 08:33.

## 2020-04-16 NOTE — PLAN OF CARE
Problem: Patient Care Overview  Goal: Plan of Care Review  Outcome: Ongoing (interventions implemented as appropriate)  Flowsheets (Taken 4/16/2020 0256)  Progress: no change  Plan of Care Reviewed With: patient  Outcome Summary: pt rested well throughout night, no c/o pain, no c/o n/v/d, npo since midnight for EGD today, hopeful to go home today, vss, will continue to monitor.

## 2020-04-16 NOTE — OP NOTE
See EGD report for details.  EGD summary    Grade 1 distal esophageal varices not requiring banding noted.  Small patch of Correia's esophagus also noted but not biopsied due to underlying cirrhosis related coagulopathy.  Mild portal hypertensive gastropathy noted without gastric varices.    Impression  Early esophageal varices at low risk for bleeding  Correia's esophagus not biopsied due to underlying cirrhosis related coagulopathy    Plan  Okay for DC home with follow-up in liver transplant clinic  EGD in 1 year for surveillance of his Correia's esophagus is recommended hopefully post transplant Correia's biopsy, in the absence of underlying portal hypertension, could be undertaken

## 2020-04-16 NOTE — PROGRESS NOTES
Adult Nutrition  Assessment/PES    Patient Name:  Unruly Liang  YOB: 1965  MRN: [id number]  Admit Date:  4/15/2020    Assessment Date:  4/16/2020      Reason for Assessment     Row Name 04/16/20 1120          Reason for Assessment    Reason For Assessment  -- education; 35 mins     Diagnosis  -- hyperkalemia, CKD, hepatorenal syndrome, chronic hyponatremia. H/o ETOH cirrhosis with ascites and varices; pt awaiting liver transplant.          Nutrition/Diet History     Row Name 04/16/20 1124          Nutrition/Diet History    Factors Affecting Nutritional Intake  -- pt states he has recently been eating 10 oranges/d; RD advised pt against this d/t high K content. RD advised pt on high K foods and also discussed low K alternative fruit/vegetable options.          Anthropometrics     Row Name 04/16/20 1121          Admit Weight    Admit Weight  -- ht=69in, ki=881zg per bed wt on 4/15. BMI=28.9         Labs/Tests/Procedures/Meds     Row Name 04/16/20 1120          Labs/Procedures/Meds    Lab Results Reviewed  reviewed   noted low Na and high K today             Nutrition Prescription Ordered     Row Name 04/16/20 1121          Nutrition Prescription PO    Current PO Diet  -- low K, 2gram Na.                 Problem/Interventions:  Problem 1     Row Name 04/16/20 1122          Nutrition Diagnoses Problem 1    Problem 1  Knowledge Deficit     Etiology (related to)  MNT for Treatment/Condition   pt with CKD and hepatorenal syndrome, cirrhosis with ascites and varices     Signs/Symptoms (evidenced by)  Reported  Information Deficit                     Education/Evaluation     Row Name 04/16/20 1123          Education    Education  Provided education regarding     Provided education regarding  -- instructed pt on 2gram/d Na and low K diet and gave [facility] written materials. RD Advised against salt substitute (KCl) use since pt was considering use. Pt very receptive to teaching, eager to learn and comply with  guidelines taught. Pt states wife is supportive.        Monitor/Evaluation    Monitor  Per protocol           Electronically signed by:  Marleny Doe MS,RD,LD  04/16/20 11:25

## 2020-04-17 ENCOUNTER — READMISSION MANAGEMENT (OUTPATIENT)
Dept: CALL CENTER | Facility: HOSPITAL | Age: 55
End: 2020-04-17

## 2020-04-17 ENCOUNTER — RESULTS ENCOUNTER (OUTPATIENT)
Dept: GASTROENTEROLOGY | Facility: CLINIC | Age: 55
End: 2020-04-17

## 2020-04-17 ENCOUNTER — RESULTS ENCOUNTER (OUTPATIENT)
Dept: OTHER | Facility: HOSPITAL | Age: 55
End: 2020-04-17

## 2020-04-17 DIAGNOSIS — K70.31 ALCOHOLIC CIRRHOSIS OF LIVER WITH ASCITES (HCC): ICD-10-CM

## 2020-04-17 NOTE — OUTREACH NOTE
Prep Survey      Responses   [facility] facility patient discharged from?  [place]   Is LACE score < 7 ?  No   Eligibility  Readm Mgmt   Discharge diagnosis  Bleeding esophageal varices, unspecified esophageal varices type    COVID-19 Test Status  Not tested   Does the patient have one of the following disease processes/diagnoses(primary or secondary)?  Other   Does the patient have Home health ordered?  No   Is there a DME ordered?  No   Prep survey completed?  Yes          Lauren Martin RN

## 2020-04-18 LAB
BACTERIA FLD CULT: NORMAL
GRAM STN SPEC: NORMAL
GRAM STN SPEC: NORMAL

## 2020-04-20 ENCOUNTER — PREP FOR SURGERY (OUTPATIENT)
Dept: OTHER | Facility: HOSPITAL | Age: 55
End: 2020-04-20

## 2020-04-20 ENCOUNTER — READMISSION MANAGEMENT (OUTPATIENT)
Dept: CALL CENTER | Facility: HOSPITAL | Age: 55
End: 2020-04-20

## 2020-04-20 DIAGNOSIS — K74.69 OTHER CIRRHOSIS OF LIVER (HCC): ICD-10-CM

## 2020-04-20 DIAGNOSIS — K70.31 ALCOHOLIC CIRRHOSIS OF LIVER WITH ASCITES (HCC): Primary | ICD-10-CM

## 2020-04-20 RX ORDER — ALBUMIN (HUMAN) 12.5 G/50ML
12.5 SOLUTION INTRAVENOUS ONCE
Status: CANCELLED | OUTPATIENT
Start: 2020-04-20 | End: 2020-04-20

## 2020-04-20 NOTE — OUTREACH NOTE
Medical Week 1 Survey      Responses   [facility] patient discharged from?  [place]   COVID-19 Test Status  Not tested   Does the patient have one of the following disease processes/diagnoses(primary or secondary)?  Other   Is there a successful TCM telephone encounter documented?  No   Week 1 attempt successful?  Yes   Call start time  1155   Call end time  1201   Discharge diagnosis  Bleeding esophageal varices, unspecified esophageal varices type    Is patient permission given to speak with other caregiver?  Yes   Person spoke with today (if not patient) and relationship  Navya   Meds reviewed with patient/caregiver?  Yes   Is the patient having any side effects they believe may be caused by any medication additions or changes?  No   Does the patient have all medications ordered at discharge?  N/A   Is the patient taking all medications as directed (includes completed medication regime)?  Yes   Does the patient have a primary care provider?   Yes   Does the patient have an appointment with their PCP within 7 days of discharge?  No   What is preventing the patient from scheduling follow up appointments within 7 days of discharge?  Earlier appointment not available   Nursing Interventions  Verified appointment date/time/provider   Has the patient kept scheduled appointments due by today?  N/A   Has home health visited the patient within 72 hours of discharge?  N/A   Psychosocial issues?  No   Did the patient receive a copy of their discharge instructions?  Yes   Nursing interventions  Reviewed instructions with patient   What is the patient's perception of their health status since discharge?  Improving   Is the patient/caregiver able to teach back signs and symptoms related to disease process for when to call PCP?  Yes   Is the patient/caregiver able to teach back signs and symptoms related to disease process for when to call 911?  Yes   Is the patient/caregiver able to teach back the hierarchy of who to  call/visit for symptoms/problems? PCP, Specialist, Home health nurse, Urgent Care, ED, 911  Yes   Additional teach back comments  Paracentesis tomorrow, liver transplant evals at  and fast tracking,  no issues at this time.   Week 1 call completed?  Yes          Gayla Zhou RN

## 2020-04-20 NOTE — NURSING NOTE
Attempted to contact patient pre-procedure.  Left voicemail to let patient know that he is to arrive at registration at 12:30 pm, and that if he has developed a fever, cough, sore throat, or had an exposure to anyone that has been sick he should call the ir dept unit prior to coming in the morning.

## 2020-04-21 ENCOUNTER — HOSPITAL ENCOUNTER (OUTPATIENT)
Dept: CT IMAGING | Facility: HOSPITAL | Age: 55
Discharge: HOME | End: 2020-04-21
Admitting: RADIOLOGY

## 2020-04-21 VITALS
HEART RATE: 108 BPM | SYSTOLIC BLOOD PRESSURE: 118 MMHG | WEIGHT: 185 LBS | TEMPERATURE: 97.6 F | HEIGHT: 69 IN | DIASTOLIC BLOOD PRESSURE: 75 MMHG | RESPIRATION RATE: 16 BRPM | BODY MASS INDEX: 27.4 KG/M2 | OXYGEN SATURATION: 100 %

## 2020-04-21 DIAGNOSIS — K70.31 ALCOHOLIC CIRRHOSIS OF LIVER WITH ASCITES (HCC): ICD-10-CM

## 2020-04-21 DIAGNOSIS — K74.69 OTHER CIRRHOSIS OF LIVER (HCC): ICD-10-CM

## 2020-04-21 PROCEDURE — P9047 ALBUMIN (HUMAN), 25%, 50ML: HCPCS | Performed by: NURSE PRACTITIONER

## 2020-04-21 PROCEDURE — 25010000003 LIDOCAINE 1 % SOLUTION: Performed by: RADIOLOGY

## 2020-04-21 PROCEDURE — 75989 ABSCESS DRAINAGE UNDER X-RAY: CPT

## 2020-04-21 PROCEDURE — 25010000002 ALBUMIN HUMAN 25% PER 50 ML: Performed by: NURSE PRACTITIONER

## 2020-04-21 RX ORDER — SODIUM CHLORIDE 0.9 % (FLUSH) 0.9 %
10 SYRINGE (ML) INJECTION AS NEEDED
Status: DISCONTINUED | OUTPATIENT
Start: 2020-04-21 | End: 2020-04-22 | Stop reason: HOSPADM

## 2020-04-21 RX ORDER — LIDOCAINE HYDROCHLORIDE 10 MG/ML
20 INJECTION, SOLUTION INFILTRATION; PERINEURAL ONCE
Status: COMPLETED | OUTPATIENT
Start: 2020-04-21 | End: 2020-04-21

## 2020-04-21 RX ORDER — ALBUMIN (HUMAN) 12.5 G/50ML
12.5 SOLUTION INTRAVENOUS ONCE
Status: COMPLETED | OUTPATIENT
Start: 2020-04-21 | End: 2020-04-21

## 2020-04-21 RX ADMIN — ALBUMIN HUMAN 12.5 G: 0.25 SOLUTION INTRAVENOUS at 15:30

## 2020-04-21 RX ADMIN — LIDOCAINE HYDROCHLORIDE 20 ML: 10 INJECTION, SOLUTION INFILTRATION; PERINEURAL at 14:36

## 2020-04-21 NOTE — POST-PROCEDURE NOTE
Interventional Radiology Operative Note    Date: 04/21/20     Time: 15:51     Pre-op Diagnosis: Decompensated cirrhosis. Recurrent large volume symptomatic ascites  Post-op Diagnosis: Same    Procedure: CT guided paracentesis    Surgeon: ZEYNEP Marie M.D.  Assistants: None    Sedation: None    Estimated Blood Loss (EBL): Trace     Urine Output (UOP): N/A (short procedure)    IVF: N/A (short procedure)    Findings: Large volume ascites    Specimens: 5700 mL serous fluid    Complications: No immediate    Disposition: Recovery. Stable.

## 2020-04-21 NOTE — NURSING NOTE
Patient placed on cardiac monitors, vital signs stable. Images taken and reviewed by Dr. Marie. Paracentesis performed with a total volume of 5800 mL of fluid was removed  Patient tolerated well, vitals remained stable throughout procedure. Report called to SARAH and patient returned to unit via stretcher.

## 2020-04-22 ENCOUNTER — TELEPHONE (OUTPATIENT)
Dept: INFUSION THERAPY | Facility: HOSPITAL | Age: 55
End: 2020-04-22

## 2020-04-24 ENCOUNTER — RESULTS ENCOUNTER (OUTPATIENT)
Dept: GASTROENTEROLOGY | Facility: CLINIC | Age: 55
End: 2020-04-24

## 2020-04-24 DIAGNOSIS — K70.31 ALCOHOLIC CIRRHOSIS OF LIVER WITH ASCITES (HCC): ICD-10-CM

## 2020-04-25 NOTE — PROGRESS NOTES
Subjective   Unruly Liang is a 54 y.o. male.     History of Present Illness     Here for f/u on:    Alcoholic cirrhosis-he is being seen at  for transplant.  He needs vaccinations updated.  The letter from  indicates he needs an up-to-date Tdap as well as pneumonia vaccination.  He has started the viral hepatitis vaccines- he received first hep A and hep B vaccines on 2/26/2020 at [facility]. He has not had the 2nd hep B yet.  He is wondering about shingles vaccination as well  He has not had any alcohol since diagnosis of cirrhosis 2 months ago  He was seen at  last week for transplant evaluation and had labs there    He is wondering if there is anything he can take for nausea. Eats a little in the morning but generally very little appetite through the rest of the day.  He is getting paracentesis weekly      Current Outpatient Medications on File Prior to Visit   Medication Sig Dispense Refill   • famotidine (PEPCID) 20 MG tablet Take 1 tablet by mouth At Night As Needed for Heartburn. 30 tablet 1   • lactulose (CHRONULAC) 10 GM/15ML solution Take 30 mL by mouth 3 (Three) Times a Day As Needed for constipation. Titrate for 2 bowel movements a day. 473 mL 1   • midodrine (PROAMATINE) 5 MG tablet Take 1 tablet by mouth 3 (Three) Times a Day. 90 tablet 1   • riFAXIMin (Xifaxan) 550 MG tablet Take 1 tablet by mouth Every 12 (Twelve) Hours. 60 tablet 11     No current facility-administered medications on file prior to visit.        The following portions of the patient's history were reviewed and updated as appropriate: allergies, current medications, past family history, past medical history, past social history, past surgical history and problem list.    Review of Systems   Constitutional: Positive for activity change, appetite change and fatigue. Negative for chills, diaphoresis, fever, unexpected weight gain and unexpected weight loss.   Respiratory: Positive for shortness of breath (gets winded very easily).   "  Cardiovascular: Negative for chest pain.   Gastrointestinal: Positive for abdominal distention, abdominal pain (sore from the paracentesis) and diarrhea (w/ lactulose several loose BM daily). Negative for constipation.   Skin: Positive for color change (jaundice).   Allergic/Immunologic: Positive for immunocompromised state.   Neurological: Positive for weakness. Negative for seizures and speech difficulty.   Psychiatric/Behavioral: Positive for sleep disturbance (naps during day and hard to sleep at night).       Objective   /78   Pulse (!) 126   Ht 175.3 cm (69\")   Wt 83.9 kg (185 lb)   SpO2 99%   BMI 27.32 kg/m²   Physical Exam   Constitutional: He is oriented to person, place, and time. He appears well-developed and well-nourished. No distress.   Ill appearing but in NAD   HENT:   Head: Normocephalic and atraumatic.   Eyes: Pupils are equal, round, and reactive to light. EOM are normal. Right eye exhibits no discharge. Left eye exhibits no discharge.   sclericterus   Neck: Normal range of motion. Neck supple.   Cardiovascular: Normal rate and regular rhythm.   Pulmonary/Chest: Effort normal and breath sounds normal.   Abdominal: He exhibits distension. There is tenderness (mild right sided tenderenss).   Musculoskeletal: He exhibits no edema.   Neurological: He is alert and oriented to person, place, and time. No cranial nerve deficit.   Skin: Skin is warm and dry. No rash noted. He is not diaphoretic.   jaundiced   Psychiatric: He has a normal mood and affect. His behavior is normal. Judgment and thought content normal.   Nursing note and vitals reviewed.        Assessment/Plan   Unruly was seen today for hepatitis b and immunizations.    Diagnoses and all orders for this visit:    Alcoholic cirrhosis of liver with ascites (CMS/HCC)-patient seeing GI at [facility], [place], operated by [facility] and  transplant clinic.  He is getting weekly paracenteses.  He needs vaccinations updated before his transplant.  vaccinations-we will " give Tdap and Prevnar today.  We will give him pneumovax in 8 weeks.  Second Hep B today and third one 2 weeks from now.  Fourth would be due in August (6 months from the first).   is requesting he get 40 mcg at a time.  Second hep A would be due in August    Need for vaccination  -      -     Tdap Vaccine Greater Than or Equal To 6yo IM  -     Hepatitis B Vaccine Adult IM  -     Hepatitis B Vaccine Adult IM  -     Pneumococcal Conjugate Vaccine 13-Valent All (PCV13)  -     ondansetron (Zofran) 4 MG tablet; Take 1 tablet by mouth Every 8 (Eight) Hours As Needed for Nausea or Vomiting.    Nausea-  We will send in Zofran for nausea

## 2020-04-26 DIAGNOSIS — R12 HEARTBURN: ICD-10-CM

## 2020-04-27 ENCOUNTER — READMISSION MANAGEMENT (OUTPATIENT)
Dept: CALL CENTER | Facility: HOSPITAL | Age: 55
End: 2020-04-27

## 2020-04-27 ENCOUNTER — OFFICE VISIT (OUTPATIENT)
Dept: INTERNAL MEDICINE | Facility: CLINIC | Age: 55
End: 2020-04-27

## 2020-04-27 VITALS
BODY MASS INDEX: 27.4 KG/M2 | HEART RATE: 126 BPM | OXYGEN SATURATION: 99 % | DIASTOLIC BLOOD PRESSURE: 78 MMHG | WEIGHT: 185 LBS | HEIGHT: 69 IN | SYSTOLIC BLOOD PRESSURE: 112 MMHG

## 2020-04-27 DIAGNOSIS — Z23 NEED FOR VACCINATION: ICD-10-CM

## 2020-04-27 DIAGNOSIS — R11.0 NAUSEA: ICD-10-CM

## 2020-04-27 DIAGNOSIS — K70.31 ALCOHOLIC CIRRHOSIS OF LIVER WITH ASCITES (HCC): Primary | ICD-10-CM

## 2020-04-27 PROCEDURE — 90746 HEPB VACCINE 3 DOSE ADULT IM: CPT | Performed by: INTERNAL MEDICINE

## 2020-04-27 PROCEDURE — 90471 IMMUNIZATION ADMIN: CPT | Performed by: INTERNAL MEDICINE

## 2020-04-27 PROCEDURE — 99213 OFFICE O/P EST LOW 20 MIN: CPT | Performed by: INTERNAL MEDICINE

## 2020-04-27 PROCEDURE — 90670 PCV13 VACCINE IM: CPT | Performed by: INTERNAL MEDICINE

## 2020-04-27 PROCEDURE — 90472 IMMUNIZATION ADMIN EACH ADD: CPT | Performed by: INTERNAL MEDICINE

## 2020-04-27 PROCEDURE — 90715 TDAP VACCINE 7 YRS/> IM: CPT | Performed by: INTERNAL MEDICINE

## 2020-04-27 RX ORDER — FAMOTIDINE 20 MG/1
20 TABLET, FILM COATED ORAL NIGHTLY PRN
Qty: 30 TABLET | Refills: 0 | Status: SHIPPED | OUTPATIENT
Start: 2020-04-27 | End: 2020-05-11

## 2020-04-27 RX ORDER — ONDANSETRON 4 MG/1
4 TABLET, FILM COATED ORAL EVERY 8 HOURS PRN
Qty: 20 TABLET | Refills: 0 | Status: SHIPPED | OUTPATIENT
Start: 2020-04-27 | End: 2020-05-11

## 2020-04-27 NOTE — OUTREACH NOTE
Medical Week 2 Survey      Responses   [facility] patient discharged from?  [place]   COVID-19 Test Status  Not tested   Does the patient have one of the following disease processes/diagnoses(primary or secondary)?  Other   Week 2 attempt successful?  Yes   Call start time  1103   Discharge diagnosis  Bleeding esophageal varices, unspecified esophageal varices type    Call end time  1109   Person spoke with today (if not patient) and relationship  Navya, wife   Meds reviewed with patient/caregiver?  Yes   Is the patient having any side effects they believe may be caused by any medication additions or changes?  No   Does the patient have all medications ordered at discharge?  N/A   Is the patient taking all medications as directed (includes completed medication regime)?  Yes   Does the patient have a primary care provider?   Yes   Does the patient have an appointment with their PCP within 7 days of discharge?  Yes   Has the patient kept scheduled appointments due by today?  Yes   Comments  saw Dr Ware today, 4/27/20   Has home health visited the patient within 72 hours of discharge?  N/A   Psychosocial issues?  No   Comments  jaundice decreasing, ascites still present, will see Dr Kearney on 4/28 for weekly paracentesis   Did the patient receive a copy of their discharge instructions?  Yes   Nursing interventions  Reviewed instructions with patient   What is the patient's perception of their health status since discharge?  Same   Is the patient/caregiver able to teach back signs and symptoms related to disease process for when to call PCP?  Yes   Is the patient/caregiver able to teach back signs and symptoms related to disease process for when to call 911?  Yes   Is the patient/caregiver able to teach back the hierarchy of who to call/visit for symptoms/problems? PCP, Specialist, Home health nurse, Urgent Care, ED, 911  Yes   Additional teach back comments  wife states condition same, still has dietary  problems, eats small amount, then feels full before getting adequate intake, is awaiting liver transplant status from [facility], if not eligible, will be making some alternate decisions including Hospice   Week 2 Call Completed?  Yes          Geovanna Messer RN

## 2020-04-30 ENCOUNTER — HOSPITAL ENCOUNTER (OUTPATIENT)
Dept: CT IMAGING | Facility: HOSPITAL | Age: 55
Discharge: HOME | End: 2020-04-30
Admitting: RADIOLOGY

## 2020-04-30 VITALS
BODY MASS INDEX: 28.68 KG/M2 | WEIGHT: 193.6 LBS | SYSTOLIC BLOOD PRESSURE: 111 MMHG | HEIGHT: 69 IN | HEART RATE: 101 BPM | OXYGEN SATURATION: 100 % | DIASTOLIC BLOOD PRESSURE: 58 MMHG | TEMPERATURE: 97 F | RESPIRATION RATE: 20 BRPM

## 2020-04-30 DIAGNOSIS — K74.69 OTHER CIRRHOSIS OF LIVER (HCC): ICD-10-CM

## 2020-04-30 DIAGNOSIS — K70.31 ALCOHOLIC CIRRHOSIS OF LIVER WITH ASCITES (HCC): ICD-10-CM

## 2020-04-30 PROCEDURE — 25010000002 ALBUMIN HUMAN 25% PER 50 ML: Performed by: NURSE PRACTITIONER

## 2020-04-30 PROCEDURE — 75989 ABSCESS DRAINAGE UNDER X-RAY: CPT

## 2020-04-30 PROCEDURE — 25010000003 LIDOCAINE 1 % SOLUTION: Performed by: RADIOLOGY

## 2020-04-30 PROCEDURE — P9047 ALBUMIN (HUMAN), 25%, 50ML: HCPCS | Performed by: NURSE PRACTITIONER

## 2020-04-30 PROCEDURE — C1729 CATH, DRAINAGE: HCPCS

## 2020-04-30 RX ORDER — LIDOCAINE HYDROCHLORIDE 10 MG/ML
10 INJECTION, SOLUTION INFILTRATION; PERINEURAL ONCE
Status: COMPLETED | OUTPATIENT
Start: 2020-04-30 | End: 2020-04-30

## 2020-04-30 RX ORDER — ALBUMIN (HUMAN) 12.5 G/50ML
12.5 SOLUTION INTRAVENOUS ONCE
Status: COMPLETED | OUTPATIENT
Start: 2020-04-30 | End: 2020-04-30

## 2020-04-30 RX ORDER — SODIUM CHLORIDE 0.9 % (FLUSH) 0.9 %
10 SYRINGE (ML) INJECTION AS NEEDED
Status: DISCONTINUED | OUTPATIENT
Start: 2020-04-30 | End: 2020-05-01 | Stop reason: HOSPADM

## 2020-04-30 RX ORDER — SODIUM CHLORIDE 0.9 % (FLUSH) 0.9 %
3 SYRINGE (ML) INJECTION EVERY 12 HOURS SCHEDULED
Status: DISCONTINUED | OUTPATIENT
Start: 2020-04-30 | End: 2020-05-01 | Stop reason: HOSPADM

## 2020-04-30 RX ORDER — LIDOCAINE HYDROCHLORIDE 10 MG/ML
10 INJECTION, SOLUTION EPIDURAL; INFILTRATION; INTRACAUDAL; PERINEURAL ONCE
Status: DISCONTINUED | OUTPATIENT
Start: 2020-04-30 | End: 2020-05-01 | Stop reason: HOSPADM

## 2020-04-30 RX ADMIN — ALBUMIN HUMAN 12.5 G: 0.25 SOLUTION INTRAVENOUS at 12:05

## 2020-04-30 RX ADMIN — LIDOCAINE HYDROCHLORIDE 10 ML: 10 INJECTION, SOLUTION INFILTRATION; PERINEURAL at 10:36

## 2020-04-30 RX ADMIN — ALBUMIN HUMAN 25 G: 0.25 SOLUTION INTRAVENOUS at 11:35

## 2020-05-01 ENCOUNTER — RESULTS ENCOUNTER (OUTPATIENT)
Dept: OTHER | Facility: HOSPITAL | Age: 55
End: 2020-05-01

## 2020-05-01 ENCOUNTER — RESULTS ENCOUNTER (OUTPATIENT)
Dept: GASTROENTEROLOGY | Facility: CLINIC | Age: 55
End: 2020-05-01

## 2020-05-01 ENCOUNTER — PREP FOR SURGERY (OUTPATIENT)
Dept: OTHER | Facility: HOSPITAL | Age: 55
End: 2020-05-01

## 2020-05-01 ENCOUNTER — TELEPHONE (OUTPATIENT)
Dept: INFUSION THERAPY | Facility: HOSPITAL | Age: 55
End: 2020-05-01

## 2020-05-01 DIAGNOSIS — K70.31 ALCOHOLIC CIRRHOSIS OF LIVER WITH ASCITES (HCC): Primary | ICD-10-CM

## 2020-05-01 DIAGNOSIS — K70.31 ALCOHOLIC CIRRHOSIS OF LIVER WITH ASCITES (HCC): ICD-10-CM

## 2020-05-01 DIAGNOSIS — K74.69 OTHER CIRRHOSIS OF LIVER (HCC): ICD-10-CM

## 2020-05-01 RX ORDER — ALBUMIN (HUMAN) 12.5 G/50ML
12.5 SOLUTION INTRAVENOUS ONCE
Status: CANCELLED | OUTPATIENT
Start: 2020-05-01 | End: 2020-05-01

## 2020-05-04 ENCOUNTER — READMISSION MANAGEMENT (OUTPATIENT)
Dept: CALL CENTER | Facility: HOSPITAL | Age: 55
End: 2020-05-04

## 2020-05-04 NOTE — OUTREACH NOTE
Medical Week 3 Survey      Responses   [facility] patient discharged from?  [place]   Does the patient have one of the following disease processes/diagnoses(primary or secondary)?  Other   Week 3 attempt successful?  Yes   Call start time  1519   Call end time  1522   Medication alerts for this patient  no changes in medication   Meds reviewed with patient/caregiver?  Yes   Is the patient taking all medications as directed (includes completed medication regime)?  Yes   Comments regarding appointments  Saw  on Friday and had a parencentisis on Thurday   Has the patient kept scheduled appointments due by today?  Yes   Has home health visited the patient within 72 hours of discharge?  N/A   Pulse Ox monitoring  None   What is the patient's perception of their health status since discharge?  Same [still retaining the fluid]   Week 3 Call Completed?  Yes   Wrap up additional comments  has not change in anything in 3 months          Mary Jo RN

## 2020-05-07 ENCOUNTER — HOSPITAL ENCOUNTER (OUTPATIENT)
Dept: CT IMAGING | Facility: HOSPITAL | Age: 55
Discharge: HOME | End: 2020-05-07
Admitting: RADIOLOGY

## 2020-05-07 VITALS
HEART RATE: 107 BPM | RESPIRATION RATE: 18 BRPM | OXYGEN SATURATION: 100 % | WEIGHT: 190.6 LBS | TEMPERATURE: 96.6 F | DIASTOLIC BLOOD PRESSURE: 78 MMHG | HEIGHT: 69 IN | SYSTOLIC BLOOD PRESSURE: 112 MMHG | BODY MASS INDEX: 28.23 KG/M2

## 2020-05-07 DIAGNOSIS — K74.69 OTHER CIRRHOSIS OF LIVER (HCC): ICD-10-CM

## 2020-05-07 DIAGNOSIS — K70.31 ALCOHOLIC CIRRHOSIS OF LIVER WITH ASCITES (HCC): ICD-10-CM

## 2020-05-07 PROCEDURE — P9047 ALBUMIN (HUMAN), 25%, 50ML: HCPCS | Performed by: NURSE PRACTITIONER

## 2020-05-07 PROCEDURE — C1729 CATH, DRAINAGE: HCPCS

## 2020-05-07 PROCEDURE — 25010000003 LIDOCAINE 1 % SOLUTION: Performed by: NURSE PRACTITIONER

## 2020-05-07 PROCEDURE — 75989 ABSCESS DRAINAGE UNDER X-RAY: CPT

## 2020-05-07 PROCEDURE — 25010000002 ALBUMIN HUMAN 25% PER 50 ML: Performed by: NURSE PRACTITIONER

## 2020-05-07 RX ORDER — LIDOCAINE HYDROCHLORIDE 10 MG/ML
20 INJECTION, SOLUTION INFILTRATION; PERINEURAL ONCE
Status: COMPLETED | OUTPATIENT
Start: 2020-05-07 | End: 2020-05-07

## 2020-05-07 RX ORDER — ALBUMIN (HUMAN) 12.5 G/50ML
12.5 SOLUTION INTRAVENOUS ONCE
Status: COMPLETED | OUTPATIENT
Start: 2020-05-07 | End: 2020-05-07

## 2020-05-07 RX ORDER — SODIUM CHLORIDE 0.9 % (FLUSH) 0.9 %
10 SYRINGE (ML) INJECTION AS NEEDED
Status: DISCONTINUED | OUTPATIENT
Start: 2020-05-07 | End: 2020-05-08 | Stop reason: HOSPADM

## 2020-05-07 RX ORDER — SODIUM CHLORIDE 0.9 % (FLUSH) 0.9 %
3 SYRINGE (ML) INJECTION EVERY 12 HOURS SCHEDULED
Status: DISCONTINUED | OUTPATIENT
Start: 2020-05-07 | End: 2020-05-08 | Stop reason: HOSPADM

## 2020-05-07 RX ADMIN — LIDOCAINE HYDROCHLORIDE 20 ML: 10 INJECTION, SOLUTION INFILTRATION; PERINEURAL at 14:08

## 2020-05-07 RX ADMIN — ALBUMIN HUMAN 12.5 G: 0.25 SOLUTION INTRAVENOUS at 14:58

## 2020-05-07 NOTE — POST-PROCEDURE NOTE
Interventional Radiology Operative Note    Date: 05/07/20     Time: 18:46     Pre-op Diagnosis: Decompensated cirrhosis. Recurrent large volume symptomatic ascites  Post-op Diagnosis: Same     Procedure: CT guided paracentesis     Surgeon: ZEYNEP Marie M.D.  Assistants: None     Sedation: None     Estimated Blood Loss (EBL): Trace      Urine Output (UOP): N/A (short procedure)     IVF: N/A (short procedure)     Findings: Large volume ascites     Specimens: 6200 mL serous fluid     Complications: No immediate     Disposition: Recovery. Stable.

## 2020-05-07 NOTE — NURSING NOTE
Pt discharged from ir dept s/p paracentesis.  Pt tolerated procedure without complications.  Discharge instructions reviewed with patient who verbalizes familiarity with instructions.  Pt transported to exit via nurse.

## 2020-05-07 NOTE — NURSING NOTE
Patient placed on cardiac monitors, vitals stable. Images taken and reviewed with Dr. Marie. A total volume of 6.2 L serous fluid removed from the peritoneum. Patient tolerated well. Report to SARAH.

## 2020-05-08 ENCOUNTER — RESULTS ENCOUNTER (OUTPATIENT)
Dept: GASTROENTEROLOGY | Facility: CLINIC | Age: 55
End: 2020-05-08

## 2020-05-08 DIAGNOSIS — K70.31 ALCOHOLIC CIRRHOSIS OF LIVER WITH ASCITES (HCC): ICD-10-CM

## 2020-05-10 DIAGNOSIS — R12 HEARTBURN: ICD-10-CM

## 2020-05-11 ENCOUNTER — PREP FOR SURGERY (OUTPATIENT)
Dept: OTHER | Facility: HOSPITAL | Age: 55
End: 2020-05-11

## 2020-05-11 DIAGNOSIS — K70.31 ALCOHOLIC CIRRHOSIS OF LIVER WITH ASCITES (HCC): Primary | ICD-10-CM

## 2020-05-11 RX ORDER — FAMOTIDINE 20 MG/1
20 TABLET, FILM COATED ORAL NIGHTLY PRN
Qty: 30 TABLET | Refills: 0 | Status: SHIPPED | OUTPATIENT
Start: 2020-05-11 | End: 2020-05-12 | Stop reason: SDUPTHER

## 2020-05-11 RX ORDER — ONDANSETRON 4 MG/1
TABLET, FILM COATED ORAL
Qty: 18 TABLET | Refills: 0 | Status: SHIPPED | OUTPATIENT
Start: 2020-05-11 | End: 2020-05-12 | Stop reason: SDUPTHER

## 2020-05-11 RX ORDER — ALBUMIN (HUMAN) 12.5 G/50ML
12.5 SOLUTION INTRAVENOUS ONCE
Status: CANCELLED | OUTPATIENT
Start: 2020-05-11 | End: 2020-05-11

## 2020-05-12 DIAGNOSIS — R12 HEARTBURN: ICD-10-CM

## 2020-05-12 RX ORDER — ONDANSETRON 4 MG/1
4 TABLET, FILM COATED ORAL EVERY 12 HOURS PRN
Qty: 60 TABLET | Refills: 5 | Status: SHIPPED | OUTPATIENT
Start: 2020-05-12

## 2020-05-12 RX ORDER — FAMOTIDINE 20 MG/1
20 TABLET, FILM COATED ORAL NIGHTLY PRN
Qty: 30 TABLET | Refills: 5 | Status: SHIPPED | OUTPATIENT
Start: 2020-05-12

## 2020-05-12 RX ORDER — LACTULOSE 10 G/15ML
20 SOLUTION ORAL 2 TIMES DAILY
Qty: 946 ML | Refills: 5 | Status: SHIPPED | OUTPATIENT
Start: 2020-05-12

## 2020-05-13 ENCOUNTER — READMISSION MANAGEMENT (OUTPATIENT)
Dept: CALL CENTER | Facility: HOSPITAL | Age: 55
End: 2020-05-13

## 2020-05-13 NOTE — OUTREACH NOTE
Medical Week 4 Survey      Responses   [facility] patient discharged from?  [place]   COVID-19 Test Status  Not tested   Does the patient have one of the following disease processes/diagnoses(primary or secondary)?  Other   Week 4 attempt successful?  No          Gayla Zhou RN

## 2020-05-14 ENCOUNTER — TELEPHONE (OUTPATIENT)
Dept: INFUSION THERAPY | Facility: HOSPITAL | Age: 55
End: 2020-05-14

## 2020-05-15 ENCOUNTER — HOSPITAL ENCOUNTER (OUTPATIENT)
Dept: CT IMAGING | Facility: HOSPITAL | Age: 55
Discharge: HOME | End: 2020-05-15
Admitting: NURSE PRACTITIONER

## 2020-05-15 ENCOUNTER — RESULTS ENCOUNTER (OUTPATIENT)
Dept: OTHER | Facility: HOSPITAL | Age: 55
End: 2020-05-15

## 2020-05-15 ENCOUNTER — RESULTS ENCOUNTER (OUTPATIENT)
Dept: GASTROENTEROLOGY | Facility: CLINIC | Age: 55
End: 2020-05-15

## 2020-05-15 VITALS
OXYGEN SATURATION: 100 % | SYSTOLIC BLOOD PRESSURE: 113 MMHG | WEIGHT: 190 LBS | BODY MASS INDEX: 28.06 KG/M2 | TEMPERATURE: 97.7 F | HEART RATE: 93 BPM | RESPIRATION RATE: 18 BRPM | DIASTOLIC BLOOD PRESSURE: 81 MMHG

## 2020-05-15 DIAGNOSIS — K70.31 ALCOHOLIC CIRRHOSIS OF LIVER WITH ASCITES (HCC): ICD-10-CM

## 2020-05-15 PROCEDURE — P9047 ALBUMIN (HUMAN), 25%, 50ML: HCPCS | Performed by: NURSE PRACTITIONER

## 2020-05-15 PROCEDURE — 25010000003 LIDOCAINE 1 % SOLUTION: Performed by: RADIOLOGY

## 2020-05-15 PROCEDURE — 75989 ABSCESS DRAINAGE UNDER X-RAY: CPT

## 2020-05-15 PROCEDURE — 25010000002 ALBUMIN HUMAN 25% PER 50 ML: Performed by: NURSE PRACTITIONER

## 2020-05-15 PROCEDURE — C1729 CATH, DRAINAGE: HCPCS

## 2020-05-15 RX ORDER — LIDOCAINE HYDROCHLORIDE 10 MG/ML
20 INJECTION, SOLUTION INFILTRATION; PERINEURAL ONCE
Status: COMPLETED | OUTPATIENT
Start: 2020-05-15 | End: 2020-05-15

## 2020-05-15 RX ORDER — ALBUMIN (HUMAN) 12.5 G/50ML
12.5 SOLUTION INTRAVENOUS ONCE
Status: COMPLETED | OUTPATIENT
Start: 2020-05-15 | End: 2020-05-15

## 2020-05-15 RX ADMIN — ALBUMIN HUMAN 12.5 G: 0.25 SOLUTION INTRAVENOUS at 13:17

## 2020-05-15 RX ADMIN — LIDOCAINE HYDROCHLORIDE 20 ML: 10 INJECTION, SOLUTION INFILTRATION; PERINEURAL at 12:25

## 2020-05-15 NOTE — NURSING NOTE
Patient placed on cardiac monitors, vitals stable. Images taken and reviewed with Dr. Marie. A total volume of 6.6 L serous fluid removed from the peritoneum. Site sealed with glue. Report to SARAH.

## 2020-05-15 NOTE — POST-PROCEDURE NOTE
Interventional Radiology Operative Note    Date: 05/15/20     Time: 19:53     Pre-op Diagnosis: Cirrhosis with recurrent large volume symptomatic ascites.  Post-op Diagnosis: Same     Procedure: CT guided paracentesis     Surgeon: ZEYNEP Marie M.D.  Assistants: None     Sedation: None     Estimated Blood Loss (EBL): Trace      Urine Output (UOP): N/A (short procedure)     IVF: N/A (short procedure)     Findings: Large volume ascites     Specimens:  6600 mL serous fluid     Complications: No immediate     Disposition: Recovery. Stable.

## 2020-05-19 ENCOUNTER — OFFICE VISIT (OUTPATIENT)
Dept: INTERNAL MEDICINE | Facility: CLINIC | Age: 55
End: 2020-05-19

## 2020-05-19 ENCOUNTER — PREP FOR SURGERY (OUTPATIENT)
Dept: OTHER | Facility: HOSPITAL | Age: 55
End: 2020-05-19

## 2020-05-19 DIAGNOSIS — Z76.82 LIVER TRANSPLANT PLANNED: ICD-10-CM

## 2020-05-19 DIAGNOSIS — Z23 NEED FOR HEPATITIS B VACCINATION: Primary | ICD-10-CM

## 2020-05-19 DIAGNOSIS — K70.31 ALCOHOLIC CIRRHOSIS OF LIVER WITH ASCITES (HCC): Primary | ICD-10-CM

## 2020-05-19 DIAGNOSIS — K70.31 ALCOHOLIC CIRRHOSIS OF LIVER WITH ASCITES (HCC): ICD-10-CM

## 2020-05-19 DIAGNOSIS — K74.69 OTHER CIRRHOSIS OF LIVER (HCC): ICD-10-CM

## 2020-05-19 PROCEDURE — 90746 HEPB VACCINE 3 DOSE ADULT IM: CPT | Performed by: INTERNAL MEDICINE

## 2020-05-19 PROCEDURE — 90471 IMMUNIZATION ADMIN: CPT | Performed by: INTERNAL MEDICINE

## 2020-05-19 RX ORDER — ALBUMIN (HUMAN) 12.5 G/50ML
12.5 SOLUTION INTRAVENOUS ONCE
Status: CANCELLED | OUTPATIENT
Start: 2020-05-19 | End: 2020-05-19

## 2020-05-22 ENCOUNTER — RESULTS ENCOUNTER (OUTPATIENT)
Dept: GASTROENTEROLOGY | Facility: CLINIC | Age: 55
End: 2020-05-22

## 2020-05-22 DIAGNOSIS — K70.31 ALCOHOLIC CIRRHOSIS OF LIVER WITH ASCITES (HCC): ICD-10-CM

## 2020-05-27 ENCOUNTER — PREP FOR SURGERY (OUTPATIENT)
Dept: OTHER | Facility: HOSPITAL | Age: 55
End: 2020-05-27

## 2020-05-27 DIAGNOSIS — K70.31 ALCOHOLIC CIRRHOSIS OF LIVER WITH ASCITES (HCC): Primary | ICD-10-CM

## 2020-05-27 RX ORDER — ALBUMIN (HUMAN) 12.5 G/50ML
12.5 SOLUTION INTRAVENOUS ONCE
Status: CANCELLED | OUTPATIENT
Start: 2020-05-27 | End: 2020-05-27

## 2020-05-28 ENCOUNTER — HOSPITAL ENCOUNTER (OUTPATIENT)
Dept: CT IMAGING | Facility: HOSPITAL | Age: 55
Discharge: HOME | End: 2020-05-28
Admitting: RADIOLOGY

## 2020-05-28 VITALS
SYSTOLIC BLOOD PRESSURE: 111 MMHG | BODY MASS INDEX: 28.24 KG/M2 | TEMPERATURE: 98.8 F | DIASTOLIC BLOOD PRESSURE: 69 MMHG | RESPIRATION RATE: 16 BRPM | HEART RATE: 101 BPM | OXYGEN SATURATION: 99 % | WEIGHT: 191.2 LBS

## 2020-05-28 DIAGNOSIS — K70.31 ALCOHOLIC CIRRHOSIS OF LIVER WITH ASCITES (HCC): ICD-10-CM

## 2020-05-28 DIAGNOSIS — K74.69 OTHER CIRRHOSIS OF LIVER (HCC): ICD-10-CM

## 2020-05-28 PROCEDURE — 75989 ABSCESS DRAINAGE UNDER X-RAY: CPT

## 2020-05-28 PROCEDURE — P9047 ALBUMIN (HUMAN), 25%, 50ML: HCPCS | Performed by: NURSE PRACTITIONER

## 2020-05-28 PROCEDURE — C1729 CATH, DRAINAGE: HCPCS

## 2020-05-28 PROCEDURE — 25010000003 LIDOCAINE 1 % SOLUTION: Performed by: RADIOLOGY

## 2020-05-28 PROCEDURE — 25010000002 ALBUMIN HUMAN 25% PER 50 ML: Performed by: NURSE PRACTITIONER

## 2020-05-28 RX ORDER — ALBUMIN (HUMAN) 12.5 G/50ML
12.5 SOLUTION INTRAVENOUS ONCE
Status: COMPLETED | OUTPATIENT
Start: 2020-05-28 | End: 2020-05-28

## 2020-05-28 RX ORDER — SODIUM CHLORIDE 0.9 % (FLUSH) 0.9 %
10 SYRINGE (ML) INJECTION AS NEEDED
Status: DISCONTINUED | OUTPATIENT
Start: 2020-05-28 | End: 2020-05-29 | Stop reason: HOSPADM

## 2020-05-28 RX ORDER — LIDOCAINE HYDROCHLORIDE 10 MG/ML
20 INJECTION, SOLUTION INFILTRATION; PERINEURAL ONCE
Status: COMPLETED | OUTPATIENT
Start: 2020-05-28 | End: 2020-05-28

## 2020-05-28 RX ORDER — SODIUM CHLORIDE 0.9 % (FLUSH) 0.9 %
3 SYRINGE (ML) INJECTION EVERY 12 HOURS SCHEDULED
Status: DISCONTINUED | OUTPATIENT
Start: 2020-05-28 | End: 2020-05-29 | Stop reason: HOSPADM

## 2020-05-28 RX ADMIN — ALBUMIN HUMAN 37.5 G: 0.25 SOLUTION INTRAVENOUS at 16:08

## 2020-05-28 RX ADMIN — LIDOCAINE HYDROCHLORIDE 20 ML: 10 INJECTION, SOLUTION INFILTRATION; PERINEURAL at 15:11

## 2020-05-28 NOTE — POST-PROCEDURE NOTE
Interventional Radiology Operative Note    Date: 05/28/20     Time: 19:16     Pre-op Diagnosis: Cirrhosis with recurrent large volume symptomatic ascites.  Post-op Diagnosis: Same     Procedure: CT guided paracentesis     Surgeon: ZEYNEP Marie M.D.  Assistants: None     Sedation: None     Estimated Blood Loss (EBL): Trace      Urine Output (UOP): N/A (short procedure)     IVF: N/A (short procedure)     Findings: Large volume ascites     Specimens:  8700 mL cloudy yellow serous fluid     Complications: No immediate     Disposition: Recovery. Stable.

## 2020-05-28 NOTE — NURSING NOTE
Pt discharged from ir dept s/p paracentesis.  Pt tolerated procedure without complications. Discharge instructions reviewed with patient who verbalizes understanding.  Pt transported to exit via wheelchair per RN.

## 2020-05-28 NOTE — NURSING NOTE
CT paracentesis performed by Dr. Marie; 8700 ml fluid removed. Patient tolerated well.  Report called to SARAH.

## 2020-05-28 NOTE — H&P
History and Physical      Chief complaint Distended abdomen    Subjective     Patient is a 54 y.o. male presents with distended abdomen related to decompensated cirrhosis complicated by recurrent large volume symptomatic ascites. Patient is on transplant list.    Review of Systems   Not reviewed    History  Past Medical History:   Diagnosis Date   • Anemia 01/10/2020    caused by detox   • Anxiety 10/01/2019    caused by drinking   • Ascites due to alcoholic cirrhosis (CMS/HCC)    • Cirrhosis (CMS/HCC)    • Clotting disorder (CMS/HCC) 10/01/2019    shave cuts bleeding uncontrolible   • Depression 10/01/2019   • Erectile dysfunction 01/10/2020   • GERD (gastroesophageal reflux disease) 1995    use Prilosec   • Headache 02/09/2020    detox and since then   • History of medical problems 01/10/2020    dizziness and balance issues   • Hypertension    • Liver disease 2015   • Liver disease    • Low back pain 01/10/2020    started with fall out of beach chair not set up right by wif   • Renal insufficiency 02/25/2020   • Urinary tract infection    • Visual impairment 01/10/2020    started with detox     Past Surgical History:   Procedure Laterality Date   • ENDOSCOPY N/A 2/27/2020    Procedure: ESOPHAGOGASTRODUODENOSCOPY;  Surgeon: Charly Lazo MD;  Location:  [facility];  Service: Gastroenterology;  Laterality: N/A;   • ENDOSCOPY N/A 4/16/2020    Procedure: ESOPHAGOGASTRODUODENOSCOPY;  Surgeon: Santiago Estevez MD;  Location:  [facility];  Service: Gastroenterology;  Laterality: N/A;   • PARACENTESIS     • SINUS SURGERY  1997    broken nose     Family History   Problem Relation Age of Onset   • Alcohol abuse Brother         detox 2013   • Liver disease Brother    • Anxiety disorder Son         2013   • Anxiety disorder Son         2019   • Birth defects Son         2001 Club foot   • Birth defects Brother         born blind   • Cancer Father         pancreatic   • Heart disease Mother          heart attack fatal   • Heart attack Mother      Social History     Tobacco Use   • Smoking status: Never Smoker   • Smokeless tobacco: Never Used   Substance Use Topics   • Alcohol use: Not Currently     Comment: not drank since 2/2020   • Drug use: Never       (Not in a hospital admission)  Allergies:  Patient has no known allergies.    Objective     Vital Signs  Temp:  [98.8 °F (37.1 °C)] 98.8 °F (37.1 °C)  Heart Rate:  [] 101  Resp:  [14-18] 16  BP: ()/(61-96) 111/69    Physical Exam:    No exam performed today,    Results Review:    I reviewed the patient's new clinical results.  I reviewed the patient's new imaging results and agree with the interpretation.    Assessment/Plan       * No active hospital problems. *      Decompensated cirrhosis with recurrent large volume symptomatic ascites.  Planned CT guided paracentesis    I discussed the patients findings and my recommendations with the patient .     Ajit Marie MD  05/28/20  19:11

## 2020-05-29 ENCOUNTER — RESULTS ENCOUNTER (OUTPATIENT)
Dept: OTHER | Facility: HOSPITAL | Age: 55
End: 2020-05-29

## 2020-05-29 ENCOUNTER — RESULTS ENCOUNTER (OUTPATIENT)
Dept: GASTROENTEROLOGY | Facility: CLINIC | Age: 55
End: 2020-05-29

## 2020-05-29 DIAGNOSIS — K70.31 ALCOHOLIC CIRRHOSIS OF LIVER WITH ASCITES (HCC): ICD-10-CM

## 2020-06-03 ENCOUNTER — PREP FOR SURGERY (OUTPATIENT)
Dept: OTHER | Facility: HOSPITAL | Age: 55
End: 2020-06-03

## 2020-06-03 DIAGNOSIS — K70.31 ALCOHOLIC CIRRHOSIS OF LIVER WITH ASCITES (HCC): Primary | ICD-10-CM

## 2020-06-03 DIAGNOSIS — K74.69 OTHER CIRRHOSIS OF LIVER (HCC): ICD-10-CM

## 2020-06-03 RX ORDER — ALBUMIN (HUMAN) 12.5 G/50ML
12.5 SOLUTION INTRAVENOUS ONCE
Status: CANCELLED | OUTPATIENT
Start: 2020-06-03 | End: 2020-06-03

## 2020-06-05 ENCOUNTER — RESULTS ENCOUNTER (OUTPATIENT)
Dept: GASTROENTEROLOGY | Facility: CLINIC | Age: 55
End: 2020-06-05

## 2020-06-05 ENCOUNTER — HOSPITAL ENCOUNTER (OUTPATIENT)
Dept: CT IMAGING | Facility: HOSPITAL | Age: 55
Discharge: HOME | End: 2020-06-05
Admitting: NURSE PRACTITIONER

## 2020-06-05 VITALS
WEIGHT: 187 LBS | BODY MASS INDEX: 27.7 KG/M2 | DIASTOLIC BLOOD PRESSURE: 88 MMHG | OXYGEN SATURATION: 100 % | RESPIRATION RATE: 16 BRPM | SYSTOLIC BLOOD PRESSURE: 97 MMHG | HEART RATE: 111 BPM | HEIGHT: 69 IN | TEMPERATURE: 98.6 F

## 2020-06-05 DIAGNOSIS — K70.31 ALCOHOLIC CIRRHOSIS OF LIVER WITH ASCITES (HCC): ICD-10-CM

## 2020-06-05 DIAGNOSIS — K74.69 OTHER CIRRHOSIS OF LIVER (HCC): ICD-10-CM

## 2020-06-05 PROCEDURE — 25010000003 LIDOCAINE 1 % SOLUTION: Performed by: RADIOLOGY

## 2020-06-05 PROCEDURE — 25010000002 ALBUMIN HUMAN 25% PER 50 ML: Performed by: NURSE PRACTITIONER

## 2020-06-05 PROCEDURE — P9047 ALBUMIN (HUMAN), 25%, 50ML: HCPCS | Performed by: NURSE PRACTITIONER

## 2020-06-05 PROCEDURE — 75989 ABSCESS DRAINAGE UNDER X-RAY: CPT

## 2020-06-05 PROCEDURE — C1729 CATH, DRAINAGE: HCPCS

## 2020-06-05 RX ORDER — ALBUMIN (HUMAN) 12.5 G/50ML
12.5 SOLUTION INTRAVENOUS ONCE
Status: COMPLETED | OUTPATIENT
Start: 2020-06-05 | End: 2020-06-05

## 2020-06-05 RX ORDER — LIDOCAINE HYDROCHLORIDE 10 MG/ML
20 INJECTION, SOLUTION INFILTRATION; PERINEURAL ONCE
Status: COMPLETED | OUTPATIENT
Start: 2020-06-05 | End: 2020-06-05

## 2020-06-05 RX ADMIN — ALBUMIN HUMAN 12.5 G: 0.25 SOLUTION INTRAVENOUS at 14:27

## 2020-06-05 RX ADMIN — LIDOCAINE HYDROCHLORIDE 20 ML: 10 INJECTION, SOLUTION INFILTRATION; PERINEURAL at 12:54

## 2020-06-05 NOTE — NURSING NOTE
Patient placed on cardiac monitors, vital signs stable. Images taken and reviewed by Dr. Marie. Paracentesis performed with a total volume of 6500 mL serous fluid  removed.   Patient tolerated well, vitals remained stable throughout procedure. Report called to SARAH and patient returned to unit via stretcher.

## 2020-06-05 NOTE — POST-PROCEDURE NOTE
Interventional Radiology Operative Note    Date: 06/05/20     Time: 17:54     Pre-op Diagnosis: Cirrhosis with recurrent large volume symptomatic ascites.  Post-op Diagnosis: Same     Procedure: CT guided paracentesis     Surgeon: ZEYNEP Marie M.D.  Assistants: None     Sedation: None     Estimated Blood Loss (EBL): Trace      Urine Output (UOP): N/A (short procedure)     IVF: N/A (short procedure)     Findings: Large volume ascites     Specimens:  6500 mL cloudy yellow serous fluid     Complications: No immediate     Disposition: Recovery. Stable.

## 2020-06-05 NOTE — NURSING NOTE
Given oral discharge instructions. Verbalizes understanding. Discharged per wheelchair to front entrance with family driving.

## 2020-06-12 ENCOUNTER — RESULTS ENCOUNTER (OUTPATIENT)
Dept: OTHER | Facility: HOSPITAL | Age: 55
End: 2020-06-12

## 2020-06-12 ENCOUNTER — HOSPITAL ENCOUNTER (OUTPATIENT)
Dept: CT IMAGING | Facility: HOSPITAL | Age: 55
Discharge: HOME | End: 2020-06-12
Admitting: RADIOLOGY

## 2020-06-12 ENCOUNTER — RESULTS ENCOUNTER (OUTPATIENT)
Dept: GASTROENTEROLOGY | Facility: CLINIC | Age: 55
End: 2020-06-12

## 2020-06-12 VITALS
SYSTOLIC BLOOD PRESSURE: 108 MMHG | BODY MASS INDEX: 27.5 KG/M2 | WEIGHT: 186.2 LBS | OXYGEN SATURATION: 100 % | DIASTOLIC BLOOD PRESSURE: 80 MMHG | HEART RATE: 102 BPM | TEMPERATURE: 97 F | RESPIRATION RATE: 16 BRPM

## 2020-06-12 DIAGNOSIS — K70.31 ALCOHOLIC CIRRHOSIS OF LIVER WITH ASCITES (HCC): ICD-10-CM

## 2020-06-12 DIAGNOSIS — K74.69 OTHER CIRRHOSIS OF LIVER (HCC): ICD-10-CM

## 2020-06-12 PROCEDURE — C1729 CATH, DRAINAGE: HCPCS

## 2020-06-12 PROCEDURE — 75989 ABSCESS DRAINAGE UNDER X-RAY: CPT

## 2020-06-12 PROCEDURE — P9047 ALBUMIN (HUMAN), 25%, 50ML: HCPCS | Performed by: NURSE PRACTITIONER

## 2020-06-12 PROCEDURE — 25010000002 ALBUMIN HUMAN 25% PER 50 ML: Performed by: NURSE PRACTITIONER

## 2020-06-12 RX ORDER — LIDOCAINE HYDROCHLORIDE 10 MG/ML
20 INJECTION, SOLUTION EPIDURAL; INFILTRATION; INTRACAUDAL; PERINEURAL ONCE
Status: COMPLETED | OUTPATIENT
Start: 2020-06-12 | End: 2020-06-12

## 2020-06-12 RX ORDER — ALBUMIN (HUMAN) 12.5 G/50ML
12.5 SOLUTION INTRAVENOUS ONCE
Status: COMPLETED | OUTPATIENT
Start: 2020-06-12 | End: 2020-06-12

## 2020-06-12 RX ORDER — SPIRONOLACTONE 100 MG/1
100 TABLET, FILM COATED ORAL DAILY
COMMUNITY

## 2020-06-12 RX ORDER — FUROSEMIDE 40 MG/1
40 TABLET ORAL DAILY
COMMUNITY

## 2020-06-12 RX ADMIN — ALBUMIN HUMAN 12.5 G: 0.25 SOLUTION INTRAVENOUS at 14:49

## 2020-06-12 RX ADMIN — LIDOCAINE HYDROCHLORIDE 20 ML: 10 INJECTION, SOLUTION EPIDURAL; INFILTRATION; INTRACAUDAL; PERINEURAL at 13:58

## 2020-06-12 NOTE — NURSING NOTE
Paracentesis performed by Dr Marie.  6700 ml fluid removed.  Pt tolerated well.  Report called to SARAH   Patient's first and last name, , procedure, and correct site confirmed prior to the start of procedure.

## 2020-06-13 NOTE — POST-PROCEDURE NOTE
Interventional Radiology Operative Note    Date: 06/13/20     Time: 01:48     Pre-op Diagnosis: Cirrhosis with recurrent large volume symptomatic ascites.  Post-op Diagnosis: Same     Procedure: CT guided paracentesis     Surgeon: ZEYNEP Marie M.D.  Assistants: None     Sedation: None     Estimated Blood Loss (EBL): Trace      Urine Output (UOP): N/A (short procedure)     IVF: N/A (short procedure)     Findings: Large volume ascites     Specimens:  6700 mL cloudy yellow serous fluid     Complications: No immediate     Disposition: Recovery. Stable.

## 2020-06-15 ENCOUNTER — TELEPHONE (OUTPATIENT)
Dept: INFUSION THERAPY | Facility: HOSPITAL | Age: 55
End: 2020-06-15

## 2020-06-16 ENCOUNTER — TELEPHONE (OUTPATIENT)
Dept: GASTROENTEROLOGY | Facility: CLINIC | Age: 55
End: 2020-06-16

## 2020-06-16 NOTE — TELEPHONE ENCOUNTER
----- Message from Unruly MUÑOZJazmin JoLiang sent at 6/15/2020  3:48 PM EDT -----  Regarding: Visit Follow-Up Question  Contact: [phone]  Ravindra Andrade!  Unruly is now on diurectic meds as of 6/10 and they seem to be working! We would like to request a paracentisis for later in the week of 6/22. We are hoping he can go back to a paracentisis every 2 weeks. His MELD last week was 27 so  said be ready on deck! Thanks for your help and support!

## 2020-06-19 ENCOUNTER — RESULTS ENCOUNTER (OUTPATIENT)
Dept: GASTROENTEROLOGY | Facility: CLINIC | Age: 55
End: 2020-06-19

## 2020-06-19 DIAGNOSIS — K70.31 ALCOHOLIC CIRRHOSIS OF LIVER WITH ASCITES (HCC): ICD-10-CM

## 2020-06-25 ENCOUNTER — HOSPITAL ENCOUNTER (OUTPATIENT)
Dept: CT IMAGING | Facility: HOSPITAL | Age: 55
Discharge: HOME | End: 2020-06-25
Admitting: RADIOLOGY

## 2020-06-25 VITALS
TEMPERATURE: 97.6 F | HEART RATE: 92 BPM | HEIGHT: 69 IN | OXYGEN SATURATION: 99 % | DIASTOLIC BLOOD PRESSURE: 61 MMHG | WEIGHT: 185.8 LBS | BODY MASS INDEX: 27.52 KG/M2 | SYSTOLIC BLOOD PRESSURE: 112 MMHG | RESPIRATION RATE: 20 BRPM

## 2020-06-25 DIAGNOSIS — K74.69 OTHER CIRRHOSIS OF LIVER (HCC): ICD-10-CM

## 2020-06-25 DIAGNOSIS — K70.31 ALCOHOLIC CIRRHOSIS OF LIVER WITH ASCITES (HCC): ICD-10-CM

## 2020-06-25 PROCEDURE — 25010000002 ALBUMIN HUMAN 25% PER 50 ML: Performed by: NURSE PRACTITIONER

## 2020-06-25 PROCEDURE — C1729 CATH, DRAINAGE: HCPCS

## 2020-06-25 PROCEDURE — P9047 ALBUMIN (HUMAN), 25%, 50ML: HCPCS | Performed by: NURSE PRACTITIONER

## 2020-06-25 PROCEDURE — 75989 ABSCESS DRAINAGE UNDER X-RAY: CPT

## 2020-06-25 PROCEDURE — 25010000003 LIDOCAINE 1 % SOLUTION: Performed by: RADIOLOGY

## 2020-06-25 RX ORDER — LIDOCAINE HYDROCHLORIDE 10 MG/ML
20 INJECTION, SOLUTION INFILTRATION; PERINEURAL ONCE
Status: COMPLETED | OUTPATIENT
Start: 2020-06-25 | End: 2020-06-25

## 2020-06-25 RX ORDER — ALBUMIN (HUMAN) 12.5 G/50ML
12.5 SOLUTION INTRAVENOUS ONCE
Status: COMPLETED | OUTPATIENT
Start: 2020-06-25 | End: 2020-06-25

## 2020-06-25 RX ORDER — SODIUM CHLORIDE 0.9 % (FLUSH) 0.9 %
10 SYRINGE (ML) INJECTION AS NEEDED
Status: DISCONTINUED | OUTPATIENT
Start: 2020-06-25 | End: 2020-06-26 | Stop reason: HOSPADM

## 2020-06-25 RX ORDER — SODIUM CHLORIDE 0.9 % (FLUSH) 0.9 %
3 SYRINGE (ML) INJECTION EVERY 12 HOURS SCHEDULED
Status: DISCONTINUED | OUTPATIENT
Start: 2020-06-25 | End: 2020-06-26 | Stop reason: HOSPADM

## 2020-06-25 RX ADMIN — LIDOCAINE HYDROCHLORIDE 20 ML: 10 INJECTION, SOLUTION INFILTRATION; PERINEURAL at 14:29

## 2020-06-25 RX ADMIN — ALBUMIN HUMAN 25 G: 0.25 SOLUTION INTRAVENOUS at 15:38

## 2020-06-25 NOTE — POST-PROCEDURE NOTE
Interventional Radiology Operative Note    Date: 06/25/20     Time: 16:25     Pre-op Diagnosis: Cirrhosis with recurrent large volume symptomatic ascites.  Post-op Diagnosis: Same     Procedure: CT guided paracentesis     Surgeon: ZEYNEP Marie M.D.  Assistants: None     Sedation: None     Estimated Blood Loss (EBL): Trace      Urine Output (UOP): N/A (short procedure)     IVF: N/A (short procedure)     Findings: Large volume ascites     Specimens:  7000 mL cloudy yellow serous fluid     Complications: No immediate     Disposition: Recovery. Stable.

## 2020-06-25 NOTE — NURSING NOTE
Pt discharged from ir dept s/p paracentesis.  Pt tolerated procedure without complications.  Discharge instructions reviewed with patient who verbalized understanding.  Pt transported to exit via wheelchair per RN.

## 2020-06-25 NOTE — NURSING NOTE
Paracentesis performed by Dr Marie.  7 liters fluid removed.  Pt tolerated well.  Report called to SARAH

## 2020-06-26 ENCOUNTER — RESULTS ENCOUNTER (OUTPATIENT)
Dept: GASTROENTEROLOGY | Facility: CLINIC | Age: 55
End: 2020-06-26

## 2020-06-26 ENCOUNTER — RESULTS ENCOUNTER (OUTPATIENT)
Dept: OTHER | Facility: HOSPITAL | Age: 55
End: 2020-06-26

## 2020-06-26 DIAGNOSIS — K70.31 ALCOHOLIC CIRRHOSIS OF LIVER WITH ASCITES (HCC): ICD-10-CM

## 2020-07-03 ENCOUNTER — RESULTS ENCOUNTER (OUTPATIENT)
Dept: GASTROENTEROLOGY | Facility: CLINIC | Age: 55
End: 2020-07-03

## 2020-07-03 DIAGNOSIS — K70.31 ALCOHOLIC CIRRHOSIS OF LIVER WITH ASCITES (HCC): ICD-10-CM

## 2020-07-06 ENCOUNTER — TELEPHONE (OUTPATIENT)
Dept: GASTROENTEROLOGY | Facility: CLINIC | Age: 55
End: 2020-07-06

## 2020-07-06 NOTE — TELEPHONE ENCOUNTER
----- Message from Unruly Liang sent at 7/6/2020  6:48 AM EDT -----  Regarding: Visit Follow-Up Question  Contact: [phone]  Ravindra Andrade!  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 He is doing great. He was taken off the ventilator by 2 pm in 7/5. We are calling Radiology to let them know and to cancel his paracentisis this week.    Thank you so much for your help and guidance through this disease. Because of you, Unruly gets to live a long and healthy life!    Thank you from the bottom of our hearts!!!

## 2020-07-07 ENCOUNTER — APPOINTMENT (OUTPATIENT)
Dept: CT IMAGING | Facility: HOSPITAL | Age: 55
End: 2020-07-07

## 2020-07-08 ENCOUNTER — APPOINTMENT (OUTPATIENT)
Dept: CT IMAGING | Facility: HOSPITAL | Age: 55
End: 2020-07-08

## 2020-07-10 ENCOUNTER — RESULTS ENCOUNTER (OUTPATIENT)
Dept: OTHER | Facility: HOSPITAL | Age: 55
End: 2020-07-10

## 2020-07-10 ENCOUNTER — RESULTS ENCOUNTER (OUTPATIENT)
Dept: GASTROENTEROLOGY | Facility: CLINIC | Age: 55
End: 2020-07-10

## 2020-07-10 DIAGNOSIS — K70.31 ALCOHOLIC CIRRHOSIS OF LIVER WITH ASCITES (HCC): ICD-10-CM

## 2020-07-17 ENCOUNTER — RESULTS ENCOUNTER (OUTPATIENT)
Dept: GASTROENTEROLOGY | Facility: CLINIC | Age: 55
End: 2020-07-17

## 2020-07-17 DIAGNOSIS — K70.31 ALCOHOLIC CIRRHOSIS OF LIVER WITH ASCITES (HCC): ICD-10-CM

## 2020-07-24 ENCOUNTER — RESULTS ENCOUNTER (OUTPATIENT)
Dept: OTHER | Facility: HOSPITAL | Age: 55
End: 2020-07-24

## 2020-07-24 ENCOUNTER — RESULTS ENCOUNTER (OUTPATIENT)
Dept: GASTROENTEROLOGY | Facility: CLINIC | Age: 55
End: 2020-07-24

## 2020-07-24 DIAGNOSIS — K70.31 ALCOHOLIC CIRRHOSIS OF LIVER WITH ASCITES (HCC): ICD-10-CM

## 2020-07-31 ENCOUNTER — RESULTS ENCOUNTER (OUTPATIENT)
Dept: GASTROENTEROLOGY | Facility: CLINIC | Age: 55
End: 2020-07-31

## 2020-07-31 DIAGNOSIS — K70.31 ALCOHOLIC CIRRHOSIS OF LIVER WITH ASCITES (HCC): ICD-10-CM

## 2020-08-07 ENCOUNTER — RESULTS ENCOUNTER (OUTPATIENT)
Dept: GASTROENTEROLOGY | Facility: CLINIC | Age: 55
End: 2020-08-07

## 2020-08-07 ENCOUNTER — RESULTS ENCOUNTER (OUTPATIENT)
Dept: OTHER | Facility: HOSPITAL | Age: 55
End: 2020-08-07

## 2020-08-07 DIAGNOSIS — K70.31 ALCOHOLIC CIRRHOSIS OF LIVER WITH ASCITES (HCC): ICD-10-CM

## 2020-08-14 ENCOUNTER — RESULTS ENCOUNTER (OUTPATIENT)
Dept: GASTROENTEROLOGY | Facility: CLINIC | Age: 55
End: 2020-08-14

## 2020-08-14 DIAGNOSIS — K70.31 ALCOHOLIC CIRRHOSIS OF LIVER WITH ASCITES (HCC): ICD-10-CM

## 2020-08-21 ENCOUNTER — RESULTS ENCOUNTER (OUTPATIENT)
Dept: OTHER | Facility: HOSPITAL | Age: 55
End: 2020-08-21

## 2020-08-21 ENCOUNTER — RESULTS ENCOUNTER (OUTPATIENT)
Dept: GASTROENTEROLOGY | Facility: CLINIC | Age: 55
End: 2020-08-21

## 2020-08-21 DIAGNOSIS — K70.31 ALCOHOLIC CIRRHOSIS OF LIVER WITH ASCITES (HCC): ICD-10-CM

## 2020-08-28 ENCOUNTER — RESULTS ENCOUNTER (OUTPATIENT)
Dept: GASTROENTEROLOGY | Facility: CLINIC | Age: 55
End: 2020-08-28

## 2020-08-28 DIAGNOSIS — K70.31 ALCOHOLIC CIRRHOSIS OF LIVER WITH ASCITES (HCC): ICD-10-CM

## 2020-09-04 ENCOUNTER — RESULTS ENCOUNTER (OUTPATIENT)
Dept: OTHER | Facility: HOSPITAL | Age: 55
End: 2020-09-04

## 2020-09-04 DIAGNOSIS — K70.31 ALCOHOLIC CIRRHOSIS OF LIVER WITH ASCITES (HCC): ICD-10-CM

## (undated) DEVICE — Device: Brand: AIR/WATER CHANNEL CLEANING ADAPTER

## (undated) DEVICE — LUBE GEL ENDOGLIDE 1.1OZ

## (undated) DEVICE — CONTN GRAD MEAS TRIANG 32OZ BLK

## (undated) DEVICE — SPNG VERSALON 4X4 4PLY NONSTRL LF BG/200

## (undated) DEVICE — SYR LUERLOK 50ML

## (undated) DEVICE — ENDOGATOR HYBRID TUBING KIT FOR USE WITH ENDOGATOR IRRIGATION PUMP, OLYMPUS PUMP, GI4000 ESU, AND TORRENT IRRIGATION PUMP.: Brand: ENDOGATOR KIT

## (undated) DEVICE — Device

## (undated) DEVICE — FRCP BIOP OVAL ENDOJAW 2MM 155CM

## (undated) DEVICE — THE BITE BLOCK MAXI, LATEX FREE STRAP IS USED TO PROTECT THE ENDOSCOPE INSERTION TUBE FROM BEING BITTEN BY THE PATIENT.

## (undated) DEVICE — TUBING, SUCTION, 1/4" X 10', STRAIGHT: Brand: MEDLINE

## (undated) DEVICE — TUBING,OXYGEN,CRUSH RES,7',CLEAR,UC: Brand: MEDLINE INDUSTRIES, INC.

## (undated) DEVICE — KT ORCA ORCAPOD DISP STRL

## (undated) DEVICE — SUCTION CANISTER, 1000CC,SAFELINER: Brand: DEROYAL

## (undated) DEVICE — INTRO ACCSR BLNT TP

## (undated) DEVICE — HYBRID CO2 TUBING/CAP SET FOR OLYMPUS® SCOPES & CO2 SOURCE: Brand: ERBE

## (undated) DEVICE — FRCP BIOP COLD ENDOJAW ALLGTR CUP 2MM/CH 155CM

## (undated) DEVICE — "MH-443 SUCTION VALVE F/EVIS140 EVIS160": Brand: SUCTION VALVE

## (undated) DEVICE — "MH-438 A/W VLVE F/140 EVIS-140": Brand: AIR/WATER VALVE

## (undated) DEVICE — SOL IRR H2O BTL 1000ML STRL